# Patient Record
Sex: FEMALE | Race: WHITE | URBAN - METROPOLITAN AREA
[De-identification: names, ages, dates, MRNs, and addresses within clinical notes are randomized per-mention and may not be internally consistent; named-entity substitution may affect disease eponyms.]

---

## 2017-03-05 ENCOUNTER — HOSPITAL ENCOUNTER (EMERGENCY)
Facility: CLINIC | Age: 42
Discharge: HOME | End: 2017-03-05
Attending: EMERGENCY MEDICINE | Admitting: EMERGENCY MEDICINE
Payer: MEDICAID

## 2017-03-05 VITALS
SYSTOLIC BLOOD PRESSURE: 120 MMHG | HEART RATE: 102 BPM | WEIGHT: 190 LBS | DIASTOLIC BLOOD PRESSURE: 76 MMHG | OXYGEN SATURATION: 100 % | RESPIRATION RATE: 20 BRPM | TEMPERATURE: 98.1 F

## 2017-03-05 DIAGNOSIS — L50.9 HIVES: ICD-10-CM

## 2017-03-05 PROCEDURE — 96374 THER/PROPH/DIAG INJ IV PUSH: CPT

## 2017-03-05 PROCEDURE — 25000128 H RX IP 250 OP 636: Performed by: EMERGENCY MEDICINE

## 2017-03-05 PROCEDURE — 99284 EMERGENCY DEPT VISIT MOD MDM: CPT | Mod: 25

## 2017-03-05 PROCEDURE — 96372 THER/PROPH/DIAG INJ SC/IM: CPT

## 2017-03-05 RX ORDER — METHYLPREDNISOLONE SODIUM SUCCINATE 125 MG/2ML
125 INJECTION, POWDER, LYOPHILIZED, FOR SOLUTION INTRAMUSCULAR; INTRAVENOUS ONCE
Status: COMPLETED | OUTPATIENT
Start: 2017-03-05 | End: 2017-03-05

## 2017-03-05 RX ORDER — PREDNISONE 20 MG/1
40 TABLET ORAL DAILY
Qty: 8 TABLET | Refills: 0 | Status: SHIPPED | OUTPATIENT
Start: 2017-03-05 | End: 2017-03-09

## 2017-03-05 RX ADMIN — METHYLPREDNISOLONE SODIUM SUCCINATE 125 MG: 125 INJECTION, POWDER, FOR SOLUTION INTRAMUSCULAR; INTRAVENOUS at 22:40

## 2017-03-05 RX ADMIN — EPINEPHRINE 0.3 MG: 1 INJECTION INTRAMUSCULAR; INTRAVENOUS; SUBCUTANEOUS at 22:33

## 2017-03-05 RX ADMIN — SODIUM CHLORIDE 1000 ML: 9 INJECTION, SOLUTION INTRAVENOUS at 22:40

## 2017-03-05 NOTE — ED AVS SNAPSHOT
[facility] Emergency Department    [address] [place] [address]    Phone:  [phone]    Fax:  [phone]                                       Maddi Muller   MRN: [id number]    Department:  [facility] Emergency Department   Date of Visit:  3/5/2017           Patient Information     Date Of Birth          1975        Your diagnoses for this visit were:     Hives        You were seen by Suly Garduno MD.      Follow-up Information     Follow up with [facility] Emergency Department.    Specialty:  EMERGENCY MEDICINE    Why:  As needed, If symptoms worsen    Contact information:    [address] [place] [address] [phone]      Discharge References/Attachments     URTICARIA (HIVES), UNDERSTANDING (ENGLISH)      24 Hour Appointment Hotline       To make an appointment at any [facility] clinic, call [phone] ([phone]). If you don't have a family doctor or clinic, we will help you find one. [facility] clinics are conveniently located to serve the needs of you and your family.             Review of your medicines      START taking        Dose / Directions Last dose taken    predniSONE 20 MG tablet   Commonly known as:  DELTASONE   Dose:  40 mg   Quantity:  8 tablet        Take 2 tablets (40 mg) by mouth daily for 4 days   Refills:  0                Prescriptions were sent or printed at these locations (1 Prescription)                   Other Prescriptions                Printed at Department/Unit printer (1 of 1)         predniSONE (DELTASONE) 20 MG tablet                Orders Needing Specimen Collection     None      Pending Results     No orders found from 3/3/2017 to 3/6/2017.            Pending Culture Results     No orders found from 3/3/2017 to 3/6/2017.             Test Results from your hospital stay            Clinical Quality Measure: Blood Pressure Screening     Your blood  "pressure was checked while you were in the emergency department today. The last reading we obtained was  BP: 120/76 . Please read the guidelines below about what these numbers mean and what you should do about them.  If your systolic blood pressure (the top number) is less than 120 and your diastolic blood pressure (the bottom number) is less than 80, then your blood pressure is normal. There is nothing more that you need to do about it.  If your systolic blood pressure (the top number) is 120-139 or your diastolic blood pressure (the bottom number) is 80-89, your blood pressure may be higher than it should be. You should have your blood pressure rechecked within a year by a primary care provider.  If your systolic blood pressure (the top number) is 140 or greater or your diastolic blood pressure (the bottom number) is 90 or greater, you may have high blood pressure. High blood pressure is treatable, but if left untreated over time it can put you at risk for heart attack, stroke, or kidney failure. You should have your blood pressure rechecked by a primary care provider within the next 4 weeks.  If your provider in the emergency department today gave you specific instructions to follow-up with your doctor or provider even sooner than that, you should follow that instruction and not wait for up to 4 weeks for your follow-up visit.        Thank you for choosing [facility]       Thank you for choosing [facility] for your care. Our goal is always to provide you with excellent care. Hearing back from our patients is one way we can continue to improve our services. Please take a few minutes to complete the written survey that you may receive in the mail after you visit with us. Thank you!        netTALKhart Information     [facility] lets you send messages to your doctor, view your test results, renew your prescriptions, schedule appointments and more. To sign up, go to [email or web address] . Click on \"Log in\" on the left side " "of the screen, which will take you to the Welcome page. Then click on \"Sign up Now\" on the right side of the page.     You will be asked to enter the access code listed below, as well as some personal information. Please follow the directions to create your username and password.     Your access code is: [email or web address]  Expires: 6/3/2017 11:08 PM     Your access code will  in 90 days. If you need help or a new code, please call your [facility] clinic or [phone].        Care EveryWhere ID     This is your Care EveryWhere ID. This could be used by other organizations to access your [facility] medical records  [id number]        After Visit Summary       This is your record. Keep this with you and show to your community pharmacist(s) and doctor(s) at your next visit.                  "

## 2017-03-06 NOTE — ED NOTES
Hives started 2 weeks ago with itching, Eyes swelling started last night night owrse today. NO dificulty swallowing or breathing. ABC Intact alert and no distress. Chest pain earlier today now resolved.   Patient states has had allergic reaction in the past and prednisone had helped.

## 2017-03-06 NOTE — ED PROVIDER NOTES
History     Chief Complaint:  Allergic Reaction    HPI   Maddi Muller is a 41 year old female who presents with worsening hives and new right eyelid swelling.  Occasional hives have been present for the past three weeks but symptoms acutely worsened today.  She has been taking alegra, benadryl, and ranitidine without improvement.  No new exposures.  Previously saw an allergist after hives for 6 weeks and etiology was not determined.  Had improved with steroids then.  Today had GI symptoms with burping and a sensation of internal tightening or squeezing.  No respiratory symptoms.    Allergies:  No Known Drug Allergies      Medications:    The patient is currently on no regular medications.     Past Medical History:    The patient denies any relevant past medical history.     Past Surgical History:    History reviewed. No pertinent past surgical history.     Family History:    The patient denies any relevant family medical history.     Social History:  Smoking Status: No  Smokeless Tobacco: No  Alcohol Use: No     Review of Systems  Ten system ROS reviewed and is negative except as above      Physical Exam   Vitals:  Patient Vitals for the past 24 hrs:   BP Temp Temp src Pulse Resp SpO2 Weight   03/05/17 2315 - - - - - 100 % -   03/05/17 2300 120/76 - - 102 20 100 % -   03/05/17 2245 108/66 - - 105 - 98 % -   03/05/17 2230 - - - - - 100 % -   03/05/17 2215 - - - - - 100 % -   03/05/17 2208 114/79 - - 108 20 100 % -   03/05/17 2206 - 98.1  F (36.7  C) Oral - - - 86.2 kg (190 lb)      Physical Exam  Eyes:  Sclera white; Pupils are equal and round  ENT:    External ears and nares normal  CV:  Rate as above with regular rhythm   Resp:  Breath sounds clear and equal bilaterally    Non-labored, no retractions or accessory muscle use  GI:  Abdomen is soft, non-tender, non-distended    No rebound tenderness or peritoneal features  MS:  Moves all extremities  Skin:  Diffuse hives, right eyelid  swelling  Neuro: Speech is normal and fluent. No apparent deficit.    Emergency Department Course     Interventions:  2233 Epinephrine 0.3 mg IM  2240 Solu-medrol 125 mg IV  2240 Normal Saline 1000 mL IV      Emergency Department Course:  Nursing notes and vitals reviewed.  I performed an exam of the patient as documented above.   The patient was rechecked and her symptoms had resolved after medications here.    I discussed the treatment plan with the patient. They expressed understanding of this plan and consented to discharge. They will be discharged home with instructions for care and follow up. In addition, the patient will return to the emergency department if their symptoms persist, worsen, if new symptoms arise or if there is any concern.  All questions were answered.    I personally reviewed the laboratory results with the Patient and answered all related questions prior to discharge.    Impression & Plan      Medical Decision Making:  Maddi Muller is a 41 year old female who presents to the emergency department today with recurrent idiopathic hives.She has associated GI involvement concerning for anaphylaxis. Epinephrine was given and her hives started improving. She had no further abdominal symptoms in the department. Follow up with allergist was recommended and she will be discharged on a steroid burst.     Diagnosis:    ICD-10-CM    1. Hives L50.9       Disposition:   Discharge to home     Discharge Medications:  Discharge Medication List as of 3/5/2017 11:22 PM      START taking these medications    Details   predniSONE (DELTASONE) 20 MG tablet Take 2 tablets (40 mg) by mouth daily for 4 days, Disp-8 tablet, R-0, Local Print           Scribe Disclosure:  I, Tyler Siu, am serving as a scribe at 10:20 PM on 3/5/2017 to document services personally performed by Suly Garduno MD, based on my observations and the provider's statements to me.   3/5/2017   [facility]  \[facility] [place] [facility]\"" EMERGENCY DEPARTMENT       [facility] Hospital[facility] MD  03/07/17 8308

## 2017-03-06 NOTE — ED NOTES
Noted improvement in swelling and hives. Patient reports significant improvement in itching and discomfort. Continues to deny swelling in throat, difficulty swallowing, or shortness of breath. Patient meets discharge criteria. Discussed AVS with patient. Questions answered. Patient verbalized understanding. Patient reports being ready to go home. Patient discharged home by car with all necessary information.

## 2017-03-11 ENCOUNTER — HOSPITAL ENCOUNTER (EMERGENCY)
Facility: CLINIC | Age: 42
Discharge: HOME | End: 2017-03-11
Attending: EMERGENCY MEDICINE | Admitting: EMERGENCY MEDICINE
Payer: MEDICAID

## 2017-03-11 VITALS
SYSTOLIC BLOOD PRESSURE: 132 MMHG | OXYGEN SATURATION: 100 % | HEART RATE: 109 BPM | DIASTOLIC BLOOD PRESSURE: 77 MMHG | TEMPERATURE: 98.5 F | RESPIRATION RATE: 18 BRPM

## 2017-03-11 DIAGNOSIS — L50.9 HIVES: ICD-10-CM

## 2017-03-11 PROCEDURE — 25000132 ZZH RX MED GY IP 250 OP 250 PS 637: Performed by: EMERGENCY MEDICINE

## 2017-03-11 PROCEDURE — 99283 EMERGENCY DEPT VISIT LOW MDM: CPT

## 2017-03-11 RX ORDER — DIPHENHYDRAMINE HCL 25 MG
CAPSULE ORAL
Status: DISCONTINUED
Start: 2017-03-11 | End: 2017-03-11 | Stop reason: HOSPADM

## 2017-03-11 RX ORDER — PREDNISONE 10 MG/1
TABLET ORAL
Qty: 32 TABLET | Refills: 0 | Status: SHIPPED | OUTPATIENT
Start: 2017-03-11

## 2017-03-11 RX ORDER — DIPHENHYDRAMINE HCL 25 MG
50 CAPSULE ORAL ONCE
Status: COMPLETED | OUTPATIENT
Start: 2017-03-11 | End: 2017-03-11

## 2017-03-11 RX ADMIN — DIPHENHYDRAMINE HYDROCHLORIDE 50 MG: 25 CAPSULE ORAL at 12:57

## 2017-03-11 NOTE — ED NOTES
Patient presents with complaint of hives across arms, legs and torso. Patient states she was treated x1 week ago for anaphylaxis to an unknown substance. States that the hives returned after she ended her course of prednisone two days ago. Denies tongue, neck or throat swelling. ABC intact, A&Ox3.

## 2017-03-11 NOTE — ED AVS SNAPSHOT
[facility] Emergency Department    [address]    [facility] [address]    Phone:  [phone]    Fax:  [phone]                                       Maddi Muller   MRN: [id number]    Department:  [facility] Emergency Department   Date of Visit:  3/11/2017           After Visit Summary Signature Page     I have received my discharge instructions, and my questions have been answered. I have discussed any challenges I see with this plan with the nurse or doctor.    ..........................................................................................................................................  Patient/Patient Representative Signature      ..........................................................................................................................................  Patient Representative Print Name and Relationship to Patient    ..................................................               ................................................  Date                                            Time    ..........................................................................................................................................  Reviewed by Signature/Title    ...................................................              ..............................................  Date                                                            Time

## 2017-03-11 NOTE — DISCHARGE INSTRUCTIONS
Please call your PCP to get a referral (again) for the hives.    Come back if any difficulty breathing or lip swelling or cannot breathe.

## 2017-03-11 NOTE — ED PROVIDER NOTES
History     Chief Complaint:  Rash     HPI   Maddi Muller is a 41 year old female who presents for evaluation of a rash. The patient has a history of recurrent outbreaks of hives. She has been evaluated regarding these episodes by dermatology and an allergist without any conclusive diagnosis. She has attempted allegra, benadryl, singular, and ranitidine without improvement of her hives. She was seen in the ED on 3/5/2017, with a similar outbreak of hives and she had improvement after receiving IM Epinephrine and IV Solu-Medrol. She was discharged with a course of Prednisone, which she reports is the only medication that has helped manage her hives. Over the past day or two, the patient developed a recurrent episode of hives similar to what she has previously experienced, prompting her to seek reevaluation in the ED. She notes that she typically feels increasingly stressed prior to episodes where she develops these hives, and she also believes that these hives could be related to a potential gluten allergy. She has not recently seen her primary care physician regarding these episodes of hives.     Allergies:  NKDA     Medications:    Montelukast Sodium (SINGULAIR PO)  RANITIDINE HCL PO  HYDRALAZINE HCL PO  Fexofenadine HCl (ALLEGRA PO)    Past Medical History:    Hives  Nasal allergies    Past Surgical History:    History reviewed. No pertinent past surgical history.     Family History:    History reviewed. No pertinent family history.     Social History:  Tobacco use:    Current every day smoker - 0.50 packs / day   Alcohol use:    Negative  Marital status:       Accompanied to ED by:  Alone      Review of Systems   Skin: Positive for rash (hives ).   All other systems reviewed and are negative.  Patient presents with complaint of hives across arms, legs and torso. Patient states she was treated x1 week ago for allergic reaction to an unknown substance. States that the hives returned  after she ended her course of prednisone two days ago. Denies tongue, neck or throat swelling.  Patient has been worked up for this in the past.    Physical Exam   First Vitals:  BP: 132/77  Pulse: 109  Temp: 98.5  F (36.9  C)  Resp: 16  SpO2: 100 %      Physical Exam  GEN: patient scratchy but alert  HEAD: atraumatic, normocephalic  EYES: pupils reactive , conjunctivae normal  ENT: TMs flat and white bilaterally, oropharynx normal with no erythema or exudate, mucus membranes moist, no mucus membrane lesions, no angioedema  NECK: no cervical LAD  RESPIRATORY: no tachypnea, breath sounds clear to auscultation (no rales, wheezes, rhonchi)  CVS: normal S1/S2, no murmurs/rubs/gallops  ABDOMEN: soft, nontender, no masses or organomegaly, no rebound, positive bowel sounds  BACK: no CVAT  EXTREMITIES: intact pulses x 2 (radial pulses intact), no edema  MUSCULOSKELETAL: no deformities, no jont swelling  SKIN: warm and dry, hives over torso and legs and arms, palms and soles not affected, no target lesions  NEURO: GCS 15, cranial nerves intact.  Motor- moves all 4 extremities Sensation- intact.  Coordination- ambulatory.  Overall symmetrical exam  HEME: no bruising or petechiae/contusions  LYMPH: no lymphadenopathy      Emergency Department Course     Interventions:  1257 Benadryl 50 mg PO    Emergency Department Course:  Nursing notes and vitals reviewed.  1300: I performed an exam of the patient as documented above.     Findings and plan explained to the Patient. Patient discharged home with instructions regarding supportive care, medications, and reasons to return. The importance of close follow-up was reviewed. The patient was prescribed Prednisone.      Impression & Plan      Medical Decision Making:  Maddi Muller is a 41 year old female who has a history of hives and skin reaction for unclear reasons (has seen the dermatologist with no etiology). The patient was just here a couple weeks ago and got a  steroid taper in addition to IV Solu-Medrol and she reports that it has decreased. She has been to a dermatologist in the past who has not been able to determine the etiology of this. She has no signs of angioedema, no uvula swelling, and we will repeat the dosepak, but I do want the patient to follow up with her primary care. She was given a 14 day prednisone pack steroid taper.  Plans to refer to back to PCP and further testing.  No new drugs or soaps or clothing to suggest the etiology.    Diagnosis:    ICD-10-CM   1. Hives L50.9   2.  Urticaria.    Disposition:  Discharged to home with Prednisone.     Discharge Medications:  New Prescriptions    PREDNISONE (DELTASONE) 10 MG TABLET    15 day taper   40mg days #1, 2, 3  35mg days #4, 5, 6  30mg days #7, 8, 9  25mg days #10,11, 12  20mg days #13, 14, 15     Instructions to patient:  Please call your PCP to get a referral (again) for the hives.    Come back if any difficulty breathing or lip swelling or cannot breathe.    I, [place], am serving as a scribe at 1:00 PM on 3/11/2017 to document services personally performed by Dr. Hernandez, based on my observations and the provider's statements to me.    [facility] EMERGENCY DEPARTMENT       Kasey Hernandez MD  03/11/17 9051

## 2017-03-11 NOTE — ED AVS SNAPSHOT
[facility] Emergency Department    [address] [place] [address]    Phone:  [phone]    Fax:  [phone]                                       Maddi Muller   MRN: [id number]    Department:  [facility] Emergency Department   Date of Visit:  3/11/2017           Patient Information     Date Of Birth          1975        Your diagnoses for this visit were:     Hives        You were seen by Kasey Hernandez MD.      Follow-up Information     Follow up with Pedro Collins MD.    Specialty:  Allergy    Contact information:    [address] [facility] [facility]  [address]  [facility] [address]  [phone]          Discharge Instructions       Please call your PCP to get a referral (again) for the hives.    Come back if any difficulty breathing or lip swelling or cannot breathe.    Discharge References/Attachments     URTICARIA (HIVES), UNDERSTANDING (ENGLISH)    HIVES (ADULT) (ENGLISH)      24 Hour Appointment Hotline       To make an appointment at any [facility], call [phone] ([phone]). If you don't have a family doctor or clinic, we will help you find one. [facility] clinics are conveniently located to serve the needs of you and your family.             Review of your medicines      START taking        Dose / Directions Last dose taken    predniSONE 10 MG tablet   Commonly known as:  DELTASONE   Quantity:  32 tablet        15 day taper  40mg days #1, 2, 3 35mg days #4, 5, 6 30mg days #7, 8, 9 25mg days #10,11, 12 20mg days #13, 14, 15   Refills:  0          Our records show that you are taking the medicines listed below. If these are incorrect, please call your family doctor or clinic.        Dose / Directions Last dose taken    ALLEGRA PO        Refills:  0        HYDRALAZINE HCL PO        Refills:  0        RANITIDINE HCL PO        Refills:  0        SINGULAIR PO        Refills:  0                Prescriptions were  sent or printed at these locations (1 Prescription)                   Other Prescriptions                Printed at Department/Unit printer (1 of 1)         predniSONE (DELTASONE) 10 MG tablet                Orders Needing Specimen Collection     None      Pending Results     No orders found from 3/9/2017 to 3/12/2017.            Pending Culture Results     No orders found from 3/9/2017 to 3/12/2017.             Test Results from your hospital stay            Clinical Quality Measure: Blood Pressure Screening     Your blood pressure was checked while you were in the emergency department today. The last reading we obtained was  BP: 132/77 . Please read the guidelines below about what these numbers mean and what you should do about them.  If your systolic blood pressure (the top number) is less than 120 and your diastolic blood pressure (the bottom number) is less than 80, then your blood pressure is normal. There is nothing more that you need to do about it.  If your systolic blood pressure (the top number) is 120-139 or your diastolic blood pressure (the bottom number) is 80-89, your blood pressure may be higher than it should be. You should have your blood pressure rechecked within a year by a primary care provider.  If your systolic blood pressure (the top number) is 140 or greater or your diastolic blood pressure (the bottom number) is 90 or greater, you may have high blood pressure. High blood pressure is treatable, but if left untreated over time it can put you at risk for heart attack, stroke, or kidney failure. You should have your blood pressure rechecked by a primary care provider within the next 4 weeks.  If your provider in the emergency department today gave you specific instructions to follow-up with your doctor or provider even sooner than that, you should follow that instruction and not wait for up to 4 weeks for your follow-up visit.        Thank you for choosing [facility]       Thank you for choosing  "[facility] for your care. Our goal is always to provide you with excellent care. Hearing back from our patients is one way we can continue to improve our services. Please take a few minutes to complete the written survey that you may receive in the mail after you visit with us. Thank you!        Streetlife[employer]TalkSession Information     [facility] lets you send messages to your doctor, view your test results, renew your prescriptions, schedule appointments and more. To sign up, go to [email or web address] . Click on \"Log in\" on the left side of the screen, which will take you to the Welcome page. Then click on \"Sign up Now\" on the right side of the page.     You will be asked to enter the access code listed below, as well as some personal information. Please follow the directions to create your username and password.     Your access code is: [email or web address]  Expires: 6/3/2017 11:08 PM     Your access code will  in 90 days. If you need help or a new code, please call your [facility] clinic or [phone].        Care EveryWhere ID     This is your Care EveryWhere ID. This could be used by other organizations to access your [facility] medical records  [id number]        After Visit Summary       This is your record. Keep this with you and show to your community pharmacist(s) and doctor(s) at your next visit.                  "

## 2017-05-08 LAB
HBV SURFACE AG SERPL QL IA: NON REACTIVE
HIV 1+2 AB+HIV1 P24 AG SERPL QL IA: NON REACTIVE
RUBELLA ANTIBODY IGG QUANTITATIVE: NORMAL IU/ML

## 2017-10-02 ENCOUNTER — HOSPITAL ENCOUNTER (OUTPATIENT)
Facility: CLINIC | Age: 42
Discharge: HOME | End: 2017-10-02
Attending: OBSTETRICS & GYNECOLOGY | Admitting: OBSTETRICS & GYNECOLOGY
Payer: COMMERCIAL

## 2017-10-02 VITALS
RESPIRATION RATE: 20 BRPM | DIASTOLIC BLOOD PRESSURE: 83 MMHG | SYSTOLIC BLOOD PRESSURE: 137 MMHG | BODY MASS INDEX: 35.61 KG/M2 | TEMPERATURE: 98.9 F | WEIGHT: 235 LBS | HEART RATE: 103 BPM | HEIGHT: 68 IN

## 2017-10-02 PROCEDURE — 99213 OFFICE O/P EST LOW 20 MIN: CPT | Mod: 25

## 2017-10-02 PROCEDURE — 99214 OFFICE O/P EST MOD 30 MIN: CPT | Mod: 25

## 2017-10-02 PROCEDURE — 99214 OFFICE O/P EST MOD 30 MIN: CPT

## 2017-10-02 PROCEDURE — 59025 FETAL NON-STRESS TEST: CPT

## 2017-10-02 RX ORDER — PRENATAL VIT/IRON FUM/FOLIC AC 27MG-0.8MG
1 TABLET ORAL DAILY
COMMUNITY

## 2017-10-02 NOTE — IP AVS SNAPSHOT
[facility] Labor and Delivery    [address]    [facility] [address]    Phone:  [phone]    Fax:  [phone]                                       After Visit Summary   10/2/2017    Maddi Muller    MRN: [id number]           After Visit Summary Signature Page     I have received my discharge instructions, and my questions have been answered. I have discussed any challenges I see with this plan with the nurse or doctor.    ..........................................................................................................................................  Patient/Patient Representative Signature      ..........................................................................................................................................  Patient Representative Print Name and Relationship to Patient    ..................................................               ................................................  Date                                            Time    ..........................................................................................................................................  Reviewed by Signature/Title    ...................................................              ..............................................  Date                                                            Time

## 2017-10-02 NOTE — DISCHARGE INSTRUCTIONS
Discharge Instruction for Undelivered Patients      You were seen for: Labor Assessment  We Consulted: Dr. Sunshine  You had (Test or Medicine): fetal and uterine monitoring, cervical exam    Diet:   Drink 8 to 12 glasses of liquids (milk, juice, water) every day.  You may eat meals and snacks.     Activity:  Count fetal kicks everyday (see handout)  Call your doctor or nurse midwife if your baby is moving less than usual.     Call your provider if you notice:  Swelling in your face or increased swelling in your hands or legs.  Headaches that are not relieved by Tylenol (acetaminophen).  Changes in your vision (blurring: seeing spots or stars.)  Nausea (sick to your stomach) and vomiting (throwing up).   Weight gain of 5 pounds or more per week.  Heartburn that doesn't go away.  Signs of bladder infection: pain when you urinate (use the toilet), need to go more often and more urgently.  The bag of dougherty (rupture of membranes) breaks, or you notice leaking in your underwear.  Bright red blood in your underwear.  Abdominal (lower belly) or stomach pain.  Second (plus) baby: Contractions (tightening) less than 10 minutes apart and getting stronger.  Increase or change in vaginal discharge (note the color and amount)  Other: Please call your doctor with returning or worsening symptoms.    Follow-up:  Please call your clinic in the morning to schedule an appointment between now and Wednesday for routine prenatal care.

## 2017-10-02 NOTE — PLAN OF CARE
Data: Patient presented to [facility] at 0125.  Reason for maternal/fetal assessment per patient is to rule out labor. Patient reports off and on cramping since last evening.  Patient reports low uterine cramping that is irregular in nature, with some cramps being stronger than others.  Patient reports cramping pain to be 1-6/10 for pain.  Patient reports difficulty with bowel movements, and thinks she might be constipated.  Patient is a .  Prenatal record reviewed. Pregnancy has been uncomplicated thus far although patient has not been seen in the clinic since 19.  Gestational Age 36.1. VSS. Fetal movement present. Patient denies backache, abnormal vaginal discharge, pelvic pressure, UTI symptoms, GI problems, bloody show, vaginal bleeding, edema, headache, visual disturbances, epigastric or URQ pain, rupture of membranes. Support person, Jacky, is present.   Action: Verbal consent for EFM. Triage assessment completed. Bill of rights reviewed.    Response: Patient verbalized agreement with plan. Will contact Dr. Sunshine.

## 2017-10-02 NOTE — PROVIDER NOTIFICATION
10/02/17 0202   Provider Notification   Provider Name/Title Dr. Sunshine   Method of Notification Phone   Request Evaluate - Remote   Notification Reason Patient Arrived   Dr. Sunshine updated on pt arrival, gestational age, G&P, pt complaint (see nursing note), FHR Cat. I, uterine irritability noted, abdomen soft and non tender to palpate.  Discussed pt has not been seen in clinic since 29 weeks.  Dr. Sunshine verbalized understanding.  TORB for NST, d/c pt home with education on importance of following up weekly in clinic at this point.  MD requested this RN to educate pt to call for appt early this week.   Will update pt on POC and continue to monitor.

## 2017-10-02 NOTE — PLAN OF CARE
Data: [facility].  Cervical exam closed/thick/high.  Membranes intact.  Contractions irregular.  Action:  Presumed adequate fetal oxygenation documented (see flow record). Discharge instructions reviewed.  Patient instructed to report change in fetal movement, vaginal leaking of fluid or bleeding, abdominal pain, or any concerns related to the pregnancy to her nurse/physician.   Response: Orders to discharge home per Dr. Sunshine.  Patient verbalized understanding of education and verbalized agreement with plan.

## 2017-10-02 NOTE — IP AVS SNAPSHOT
MRN:[id number]                      After Visit Summary   10/2/2017    Maddi Muller    MRN: [id number]           Thank you!     Thank you for choosing [facility] for your care. Our goal is always to provide you with excellent care. Hearing back from our patients is one way we can continue to improve our services. Please take a few minutes to complete the written survey that you may receive in the mail after you visit. If you would like to speak to someone directly about your visit please contact Patient Relations at [phone]. Thank you!          Patient Information     Date Of Birth          1975        About your hospital stay     You were admitted on:  October 2, 2017 You last received care in the:  [facility] Labor and Delivery    You were discharged on:  October 2, 2017       Who to Call     For medical emergencies, please call 911.  For non-urgent questions about your medical care, please call your primary care provider or clinic, [phone]          Attending Provider     Provider Specialty    Jayla Sunshine MD OB/Gyn       Primary Care Provider Office Phone # Fax #    Pedro Collins [phone]      Further instructions from your care team       Discharge Instruction for Undelivered Patients      You were seen for: Labor Assessment  We Consulted: Dr. Sunshine  You had (Test or Medicine): fetal and uterine monitoring, cervical exam    Diet:   Drink 8 to 12 glasses of liquids (milk, juice, water) every day.  You may eat meals and snacks.     Activity:  Count fetal kicks everyday (see handout)  Call your doctor or nurse midwife if your baby is moving less than usual.     Call your provider if you notice:  Swelling in your face or increased swelling in your hands or legs.  Headaches that are not relieved by Tylenol (acetaminophen).  Changes in your vision (blurring: seeing spots or stars.)  Nausea (sick to your stomach)  "and vomiting (throwing up).   Weight gain of 5 pounds or more per week.  Heartburn that doesn't go away.  Signs of bladder infection: pain when you urinate (use the toilet), need to go more often and more urgently.  The bag of dougherty (rupture of membranes) breaks, or you notice leaking in your underwear.  Bright red blood in your underwear.  Abdominal (lower belly) or stomach pain.  Second (plus) baby: Contractions (tightening) less than 10 minutes apart and getting stronger.  Increase or change in vaginal discharge (note the color and amount)  Other: Please call your doctor with returning or worsening symptoms.    Follow-up:  Please call your clinic in the morning to schedule an appointment between now and Wednesday for routine prenatal care.        Pending Results     No orders found from 2017 to 10/3/2017.            Admission Information     Date & Time Provider Department Dept. Phone    10/2/2017 Jayla Sunshine MD [facility] Labor and Delivery [phone]      Your Vitals Were     Blood Pressure Pulse Temperature Height Weight BMI (Body Mass Index)    137/83 103 98.9  F (37.2  C) (Oral) 1.727 m (5' 8\") 106.6 kg (235 lb) 35.73 kg/m2      [employer] Information     [email or web address] lets you send messages to your doctor, view your test results, renew your prescriptions, schedule appointments and more. To sign up, go to [email or web address] . Click on \"Log in\" on the left side of the screen, which will take you to the Welcome page. Then click on \"Sign up Now\" on the right side of the page.     You will be asked to enter the access code listed below, as well as some personal information. Please follow the directions to create your username and password.     Your access code is: [email or web address]  Expires: 2017  2:16 AM     Your access code will  in 90 days. If you need help or a new code, please call your [place] clinic or [phone].        Care EveryWhere ID     This is your Care " EveryWhere ID. This could be used by other organizations to access your [place] medical records  [id number]        Equal Access to Services     DAINA CONCEPCION : Mackenzie Denney, watenzinda luqadaha, q[place] kaalmada carlita, ruddy melyin hayaaanais foxadelso cervantes romy he. So [facility] [phone].    ATENCIÓN: Si habla español, tiene a hawley disposición servicios gratuitos de asistencia lingüística. Llame al [phone].    We comply with applicable federal civil rights laws and [place] laws. We do not discriminate on the basis of race, color, national origin, age, disability, sex, sexual orientation, or gender identity.               Review of your medicines      UNREVIEWED medicines. Ask your doctor about these medicines        Dose / Directions    ALLEGRA PO        Refills:  0       BENADRYL PO        Dose:  50 mg   Take 50 mg by mouth every 6 hours as needed for allergies   Refills:  0       predniSONE 10 MG tablet   Commonly known as:  DELTASONE        15 day taper  40mg days #1, 2, 3 35mg days #4, 5, 6 30mg days #7, 8, 9 25mg days #10,11, 12 20mg days #13, 14, 15   Quantity:  32 tablet   Refills:  0       prenatal multivitamin plus iron 27-0.8 MG Tabs per tablet        Dose:  1 tablet   Take 1 tablet by mouth daily   Refills:  0       RANITIDINE HCL PO        Dose:  75 mg   Take 75 mg by mouth daily   Refills:  0       VISTARIL PO        Dose:  100 mg   Take 100 mg by mouth every 4 hours as needed for itching   Refills:  0                Protect others around you: Learn how to safely use, store and throw away your medicines at www.disposemymeds.org.             Medication List: This is a list of all your medications and when to take them. Check marks below indicate your daily home schedule. Keep this list as a reference.      Medications           Morning Afternoon Evening Bedtime As Needed    ALLEGRA PO                                BENADRYL PO   Take 50 mg by mouth every 6 hours as needed for allergies                                 predniSONE 10 MG tablet   Commonly known as:  DELTASONE   15 day taper  40mg days #1, 2, 3 35mg days #4, 5, 6 30mg days #7, 8, 9 25mg days #10,11, 12 20mg days #13, 14, 15                                prenatal multivitamin plus iron 27-0.8 MG Tabs per tablet   Take 1 tablet by mouth daily                                RANITIDINE HCL PO   Take 75 mg by mouth daily                                VISTARIL PO   Take 100 mg by mouth every 4 hours as needed for itching

## 2017-10-04 LAB — GROUP B STREP PCR: NEGATIVE

## 2017-10-29 ENCOUNTER — HOSPITAL ENCOUNTER (OUTPATIENT)
Facility: CLINIC | Age: 42
Discharge: HOME | End: 2017-10-29
Attending: OBSTETRICS & GYNECOLOGY | Admitting: OBSTETRICS & GYNECOLOGY
Payer: COMMERCIAL

## 2017-10-29 PROCEDURE — 25000132 ZZH RX MED GY IP 250 OP 250 PS 637: Performed by: OBSTETRICS & GYNECOLOGY

## 2017-10-29 PROCEDURE — 99213 OFFICE O/P EST LOW 20 MIN: CPT

## 2017-10-29 RX ORDER — ZOLPIDEM TARTRATE 5 MG/1
5 TABLET ORAL ONCE
Status: COMPLETED | OUTPATIENT
Start: 2017-10-29 | End: 2017-10-29

## 2017-10-29 RX ORDER — ONDANSETRON 2 MG/ML
4 INJECTION INTRAMUSCULAR; INTRAVENOUS EVERY 6 HOURS PRN
Status: DISCONTINUED | OUTPATIENT
Start: 2017-10-29 | End: 2017-10-30 | Stop reason: HOSPADM

## 2017-10-29 RX ADMIN — ZOLPIDEM TARTRATE 5 MG: 5 TABLET, FILM COATED ORAL at 23:15

## 2017-10-29 NOTE — IP AVS SNAPSHOT
[facility] Labor and Delivery    [address]    [facility] [address]    Phone:  [phone]    Fax:  [phone]                                       After Visit Summary   10/29/2017    Maddi Muller    MRN: [id number]           After Visit Summary Signature Page     I have received my discharge instructions, and my questions have been answered. I have discussed any challenges I see with this plan with the nurse or doctor.    ..........................................................................................................................................  Patient/Patient Representative Signature      ..........................................................................................................................................  Patient Representative Print Name and Relationship to Patient    ..................................................               ................................................  Date                                            Time    ..........................................................................................................................................  Reviewed by Signature/Title    ...................................................              ..............................................  Date                                                            Time

## 2017-10-29 NOTE — IP AVS SNAPSHOT
MRN:[id number]                      After Visit Summary   10/29/2017    Maddi Muller    MRN: [id number]           Thank you!     Thank you for choosing [facility] for your care. Our goal is always to provide you with excellent care. Hearing back from our patients is one way we can continue to improve our services. Please take a few minutes to complete the written survey that you may receive in the mail after you visit. If you would like to speak to someone directly about your visit please contact Patient Relations at [phone]. Thank you!          Patient Information     Date Of Birth          1975        About your hospital stay     You were admitted on:  October 29, 2017 You last received care in the:  [facility] Labor and Delivery    You were discharged on:  October 29, 2017       Who to Call     For medical emergencies, please call 911.  For non-urgent questions about your medical care, please call your primary care provider or clinic, [phone]          Attending Provider     Provider Specialty    Dl Regalado MD OB/Gyn       Primary Care Provider Office Phone # Fax #    Pedro Collins [phone]      Further instructions from your care team       Discharge Instruction for Undelivered Patients      You were seen for: Labor Assessment  We Consulted: Dr. Regalado  You had (Test or Medicine):fetal monitoring, cervical exam     Diet:   Drink 8 to 12 glasses of liquids (milk, juice, water) every day.     Activity:  Call your doctor or nurse midwife if your baby is moving less than usual.     Call your provider if you notice:  Swelling in your face or increased swelling in your hands or legs.  Headaches that are not relieved by Tylenol (acetaminophen).  Changes in your vision (blurring: seeing spots or stars.)  Nausea (sick to your stomach) and vomiting (throwing up).   Weight gain of 5 pounds or more per week.  Heartburn that doesn't  "go away.  Signs of bladder infection: pain when you urinate (use the toilet), need to go more often and more urgently.  The bag of dougherty (rupture of membranes) breaks, or you notice leaking in your underwear.  Bright red blood in your underwear.  Abdominal (lower belly) or stomach pain.  Contractions (tightening) less than 10 minutes apart and getting stronger.  Increase or change in vaginal discharge (note the color and amount)    Follow-up:  As scheduled in the clinic          Pending Results     No orders found from 10/27/2017 to 10/30/2017.            Admission Information     Date & Time Provider Department Dept. Phone    10/29/2017 Dl Regalado MD [facility] Labor and Delivery [phone]      MyChart Information     [employer] lets you send messages to your doctor, view your test results, renew your prescriptions, schedule appointments and more. To sign up, go to [email or web address] . Click on \"Log in\" on the left side of the screen, which will take you to the Welcome page. Then click on \"Sign up Now\" on the right side of the page.     You will be asked to enter the access code listed below, as well as some personal information. Please follow the directions to create your username and password.     Your access code is: [email or web address]  Expires: 2017  2:16 AM     Your access code will  in 90 days. If you need help or a new code, please call your [place] clinic or [phone].        Care EveryWhere ID     This is your Care EveryWhere ID. This could be used by other organizations to access your [place] medical records  [id number]        Equal Access to Services     [facility] campusTAMEKA : [place] Denney, jordon miguel, qaruddy yun. So [facility] [phone].    ATENCIÓN: Si habla español, tiene a hawley disposición servicios gratuitos de asistencia lingüística. Llame al [phone].    We comply with applicable federal civil " rights laws and [place] laws. We do not discriminate on the basis of race, color, national origin, age, disability, sex, sexual orientation, or gender identity.               Review of your medicines      UNREVIEWED medicines. Ask your doctor about these medicines        Dose / Directions    ALLEGRA PO        Refills:  0       BENADRYL PO        Dose:  50 mg   Take 50 mg by mouth every 6 hours as needed for allergies   Refills:  0       predniSONE 10 MG tablet   Commonly known as:  DELTASONE        15 day taper  40mg days #1, 2, 3 35mg days #4, 5, 6 30mg days #7, 8, 9 25mg days #10,11, 12 20mg days #13, 14, 15   Quantity:  32 tablet   Refills:  0       prenatal multivitamin plus iron 27-0.8 MG Tabs per tablet        Dose:  1 tablet   Take 1 tablet by mouth daily   Refills:  0       RANITIDINE HCL PO        Dose:  75 mg   Take 75 mg by mouth daily   Refills:  0       VISTARIL PO        Dose:  100 mg   Take 100 mg by mouth every 4 hours as needed for itching   Refills:  0                Protect others around you: Learn how to safely use, store and throw away your medicines at www.disposemymeds.org.             Medication List: This is a list of all your medications and when to take them. Check marks below indicate your daily home schedule. Keep this list as a reference.      Medications           Morning Afternoon Evening Bedtime As Needed    ALLEGRA PO                                BENADRYL PO   Take 50 mg by mouth every 6 hours as needed for allergies                                predniSONE 10 MG tablet   Commonly known as:  DELTASONE   15 day taper  40mg days #1, 2, 3 35mg days #4, 5, 6 30mg days #7, 8, 9 25mg days #10,11, 12 20mg days #13, 14, 15                                prenatal multivitamin plus iron 27-0.8 MG Tabs per tablet   Take 1 tablet by mouth daily                                RANITIDINE HCL PO   Take 75 mg by mouth daily                                VISTARIL PO   Take 100 mg by mouth  every 4 hours as needed for itching

## 2017-10-30 VITALS — DIASTOLIC BLOOD PRESSURE: 86 MMHG | TEMPERATURE: 98.6 F | RESPIRATION RATE: 18 BRPM | SYSTOLIC BLOOD PRESSURE: 135 MMHG

## 2017-10-30 NOTE — DISCHARGE INSTRUCTIONS
Discharge Instruction for Undelivered Patients      You were seen for: Labor Assessment  We Consulted: Dr. Regalado  You had (Test or Medicine):fetal monitoring, cervical exam     Diet:   Drink 8 to 12 glasses of liquids (milk, juice, water) every day.     Activity:  Call your doctor or nurse midwife if your baby is moving less than usual.     Call your provider if you notice:  Swelling in your face or increased swelling in your hands or legs.  Headaches that are not relieved by Tylenol (acetaminophen).  Changes in your vision (blurring: seeing spots or stars.)  Nausea (sick to your stomach) and vomiting (throwing up).   Weight gain of 5 pounds or more per week.  Heartburn that doesn't go away.  Signs of bladder infection: pain when you urinate (use the toilet), need to go more often and more urgently.  The bag of dougherty (rupture of membranes) breaks, or you notice leaking in your underwear.  Bright red blood in your underwear.  Abdominal (lower belly) or stomach pain.  Contractions (tightening) less than 10 minutes apart and getting stronger.  Increase or change in vaginal discharge (note the color and amount)    Follow-up:  As scheduled in the clinic

## 2017-10-30 NOTE — PLAN OF CARE
Patient arrived with mother to r/o labor.  States she has been feeling occasional painful contractions but has not been timing them.  Denies vaginal bleeding or leaking of fluid, states she is feeling baby move.  Patient is ]tearful.  States she is under a lot of stress, and that she bailed the FOB out of [facility] today then learned he is cheating on her.  Patient states she has had difficulty with eating and sleeping.  Patient is accompanied by 9 year old son and mother, who appears supportive of patient.  External monitors applied, health history obtained.

## 2017-10-30 NOTE — PROVIDER NOTIFICATION
10/29/17 2235   Provider Notification   Provider Name/Title Dr. Regalado   Method of Notification Phone   Request Evaluate - Remote   Notification Reason Patient Arrived   Orders received to discharge patient if cervix is unchanged.  PO ambien ordered for patient to take prior to discharge.

## 2017-10-30 NOTE — PLAN OF CARE
Discharge instructions reviewed.  Patient verbalized understanding.  States mother is going to drive her home and stay the night with her.  Discharged home at 2330.

## 2017-11-01 ENCOUNTER — HOSPITAL ENCOUNTER (INPATIENT)
Facility: CLINIC | Age: 42
LOS: 3 days | Discharge: HOME | End: 2017-11-04
Attending: OBSTETRICS & GYNECOLOGY | Admitting: OBSTETRICS & GYNECOLOGY
Payer: COMMERCIAL

## 2017-11-01 ENCOUNTER — ANESTHESIA EVENT (OUTPATIENT)
Dept: OBGYN | Facility: CLINIC | Age: 42
End: 2017-11-01
Payer: COMMERCIAL

## 2017-11-01 ENCOUNTER — ANESTHESIA (OUTPATIENT)
Dept: OBGYN | Facility: CLINIC | Age: 42
End: 2017-11-01
Payer: COMMERCIAL

## 2017-11-01 DIAGNOSIS — Z98.891 S/P CESAREAN SECTION: Primary | ICD-10-CM

## 2017-11-01 DIAGNOSIS — L50.9 HIVES: ICD-10-CM

## 2017-11-01 LAB
ABO + RH BLD: NORMAL
ALT SERPL W P-5'-P-CCNC: 8 U/L (ref 0–50)
AMPHETAMINES UR QL SCN: NEGATIVE
AST SERPL W P-5'-P-CCNC: 10 U/L (ref 0–45)
BASOPHILS # BLD AUTO: 0 10E9/L (ref 0–0.2)
BASOPHILS NFR BLD AUTO: 0.2 %
BLD GP AB SCN SERPL QL: NORMAL
BLOOD BANK CMNT PATIENT-IMP: NORMAL
CANNABINOIDS UR QL: NEGATIVE
COCAINE UR QL: NEGATIVE
CREAT UR-MCNC: 90 MG/DL
DIFFERENTIAL METHOD BLD: ABNORMAL
EOSINOPHIL # BLD AUTO: 0.2 10E9/L (ref 0–0.7)
EOSINOPHIL NFR BLD AUTO: 2.9 %
ERYTHROCYTE [DISTWIDTH] IN BLOOD BY AUTOMATED COUNT: 13.2 % (ref 10–15)
HCT VFR BLD AUTO: 32.6 % (ref 35–47)
HGB BLD-MCNC: 11.5 G/DL (ref 11.7–15.7)
IMM GRANULOCYTES # BLD: 0.1 10E9/L (ref 0–0.4)
IMM GRANULOCYTES NFR BLD: 0.7 %
LYMPHOCYTES # BLD AUTO: 1 10E9/L (ref 0.8–5.3)
LYMPHOCYTES NFR BLD AUTO: 12.2 %
MCH RBC QN AUTO: 32 PG (ref 26.5–33)
MCHC RBC AUTO-ENTMCNC: 35.3 G/DL (ref 31.5–36.5)
MCV RBC AUTO: 91 FL (ref 78–100)
MONOCYTES # BLD AUTO: 0.5 10E9/L (ref 0–1.3)
MONOCYTES NFR BLD AUTO: 5.8 %
NEUTROPHILS # BLD AUTO: 6.5 10E9/L (ref 1.6–8.3)
NEUTROPHILS NFR BLD AUTO: 78.2 %
NRBC # BLD AUTO: 0 10*3/UL
NRBC BLD AUTO-RTO: 0 /100
OPIATES UR QL SCN: NEGATIVE
PCP UR QL SCN: NEGATIVE
PLATELET # BLD AUTO: 218 10E9/L (ref 150–450)
PROT UR-MCNC: 0.14 G/L
PROT/CREAT 24H UR: 0.16 G/G CR (ref 0–0.2)
RBC # BLD AUTO: 3.59 10E12/L (ref 3.8–5.2)
SPECIMEN EXP DATE BLD: NORMAL
SPECIMEN EXP DATE BLD: NORMAL
T PALLIDUM IGG+IGM SER QL: NEGATIVE
URATE SERPL-MCNC: 4.9 MG/DL (ref 2.6–6)
WBC # BLD AUTO: 8.3 10E9/L (ref 4–11)

## 2017-11-01 PROCEDURE — 37000008 ZZH ANESTHESIA TECHNICAL FEE, 1ST 30 MIN: Performed by: OBSTETRICS & GYNECOLOGY

## 2017-11-01 PROCEDURE — 25000128 H RX IP 250 OP 636: Performed by: ANESTHESIOLOGY

## 2017-11-01 PROCEDURE — 25000125 ZZHC RX 250: Performed by: ANESTHESIOLOGY

## 2017-11-01 PROCEDURE — 25000125 ZZHC RX 250: Performed by: NURSE ANESTHETIST, CERTIFIED REGISTERED

## 2017-11-01 PROCEDURE — 84550 ASSAY OF BLOOD/URIC ACID: CPT | Performed by: OBSTETRICS & GYNECOLOGY

## 2017-11-01 PROCEDURE — 36000058 ZZH SURGERY LEVEL 3 EA 15 ADDTL MIN: Performed by: OBSTETRICS & GYNECOLOGY

## 2017-11-01 PROCEDURE — 25000128 H RX IP 250 OP 636: Performed by: OBSTETRICS & GYNECOLOGY

## 2017-11-01 PROCEDURE — 27210794 ZZH OR GENERAL SUPPLY STERILE: Performed by: OBSTETRICS & GYNECOLOGY

## 2017-11-01 PROCEDURE — 36000056 ZZH SURGERY LEVEL 3 1ST 30 MIN: Performed by: OBSTETRICS & GYNECOLOGY

## 2017-11-01 PROCEDURE — 12000031 ZZH R&B OB CRITICAL

## 2017-11-01 PROCEDURE — 37000009 ZZH ANESTHESIA TECHNICAL FEE, EACH ADDTL 15 MIN: Performed by: OBSTETRICS & GYNECOLOGY

## 2017-11-01 PROCEDURE — 25000132 ZZH RX MED GY IP 250 OP 250 PS 637: Performed by: OBSTETRICS & GYNECOLOGY

## 2017-11-01 PROCEDURE — 25000128 H RX IP 250 OP 636: Performed by: NURSE ANESTHETIST, CERTIFIED REGISTERED

## 2017-11-01 PROCEDURE — 86780 TREPONEMA PALLIDUM: CPT | Performed by: OBSTETRICS & GYNECOLOGY

## 2017-11-01 PROCEDURE — 25000125 ZZHC RX 250: Performed by: OBSTETRICS & GYNECOLOGY

## 2017-11-01 PROCEDURE — 86900 BLOOD TYPING SEROLOGIC ABO: CPT | Performed by: OBSTETRICS & GYNECOLOGY

## 2017-11-01 PROCEDURE — 80307 DRUG TEST PRSMV CHEM ANLYZR: CPT | Performed by: OBSTETRICS & GYNECOLOGY

## 2017-11-01 PROCEDURE — 86901 BLOOD TYPING SEROLOGIC RH(D): CPT | Performed by: OBSTETRICS & GYNECOLOGY

## 2017-11-01 PROCEDURE — 37000011 ZZH ANESTHESIA WARD SERVICE

## 2017-11-01 PROCEDURE — 25000128 H RX IP 250 OP 636

## 2017-11-01 PROCEDURE — 84460 ALANINE AMINO (ALT) (SGPT): CPT | Performed by: OBSTETRICS & GYNECOLOGY

## 2017-11-01 PROCEDURE — 40000671 ZZH STATISTIC ANESTHESIA CASE

## 2017-11-01 PROCEDURE — 84450 TRANSFERASE (AST) (SGOT): CPT | Performed by: OBSTETRICS & GYNECOLOGY

## 2017-11-01 PROCEDURE — 71000012 ZZH RECOVERY PHASE 1 LEVEL 1 FIRST HR: Performed by: OBSTETRICS & GYNECOLOGY

## 2017-11-01 PROCEDURE — 86850 RBC ANTIBODY SCREEN: CPT | Performed by: OBSTETRICS & GYNECOLOGY

## 2017-11-01 PROCEDURE — 85025 COMPLETE CBC W/AUTO DIFF WBC: CPT | Performed by: OBSTETRICS & GYNECOLOGY

## 2017-11-01 PROCEDURE — 84156 ASSAY OF PROTEIN URINE: CPT | Performed by: OBSTETRICS & GYNECOLOGY

## 2017-11-01 RX ORDER — AMOXICILLIN 250 MG
1-2 CAPSULE ORAL 2 TIMES DAILY
Status: DISCONTINUED | OUTPATIENT
Start: 2017-11-01 | End: 2017-11-04 | Stop reason: HOSPADM

## 2017-11-01 RX ORDER — BISACODYL 10 MG
10 SUPPOSITORY, RECTAL RECTAL DAILY PRN
Status: DISCONTINUED | OUTPATIENT
Start: 2017-11-03 | End: 2017-11-04 | Stop reason: HOSPADM

## 2017-11-01 RX ORDER — DIPHENHYDRAMINE HCL 25 MG
25 CAPSULE ORAL EVERY 6 HOURS PRN
Status: DISCONTINUED | OUTPATIENT
Start: 2017-11-01 | End: 2017-11-04 | Stop reason: HOSPADM

## 2017-11-01 RX ORDER — METHYLERGONOVINE MALEATE 0.2 MG/ML
200 INJECTION INTRAVENOUS
Status: DISCONTINUED | OUTPATIENT
Start: 2017-11-01 | End: 2017-11-01

## 2017-11-01 RX ORDER — CEFAZOLIN SODIUM 1 G/3ML
1 INJECTION, POWDER, FOR SOLUTION INTRAMUSCULAR; INTRAVENOUS SEE ADMIN INSTRUCTIONS
Status: DISCONTINUED | OUTPATIENT
Start: 2017-11-01 | End: 2017-11-01

## 2017-11-01 RX ORDER — CARBOPROST TROMETHAMINE 250 UG/ML
250 INJECTION, SOLUTION INTRAMUSCULAR
Status: DISCONTINUED | OUTPATIENT
Start: 2017-11-01 | End: 2017-11-01

## 2017-11-01 RX ORDER — OXYTOCIN 10 [USP'U]/ML
10 INJECTION, SOLUTION INTRAMUSCULAR; INTRAVENOUS
Status: DISCONTINUED | OUTPATIENT
Start: 2017-11-01 | End: 2017-11-04 | Stop reason: HOSPADM

## 2017-11-01 RX ORDER — LIDOCAINE 40 MG/G
CREAM TOPICAL
Status: DISCONTINUED | OUTPATIENT
Start: 2017-11-01 | End: 2017-11-04 | Stop reason: HOSPADM

## 2017-11-01 RX ORDER — OXYCODONE HYDROCHLORIDE 5 MG/1
5-10 TABLET ORAL
Status: DISCONTINUED | OUTPATIENT
Start: 2017-11-01 | End: 2017-11-04 | Stop reason: HOSPADM

## 2017-11-01 RX ORDER — ONDANSETRON 2 MG/ML
4 INJECTION INTRAMUSCULAR; INTRAVENOUS EVERY 6 HOURS PRN
Status: DISCONTINUED | OUTPATIENT
Start: 2017-11-01 | End: 2017-11-04 | Stop reason: HOSPADM

## 2017-11-01 RX ORDER — METOCLOPRAMIDE HYDROCHLORIDE 5 MG/ML
10 INJECTION INTRAMUSCULAR; INTRAVENOUS EVERY 6 HOURS PRN
Status: DISCONTINUED | OUTPATIENT
Start: 2017-11-01 | End: 2017-11-04 | Stop reason: HOSPADM

## 2017-11-01 RX ORDER — OXYTOCIN/0.9 % SODIUM CHLORIDE 30/500 ML
100-340 PLASTIC BAG, INJECTION (ML) INTRAVENOUS CONTINUOUS PRN
Status: DISCONTINUED | OUTPATIENT
Start: 2017-11-01 | End: 2017-11-01

## 2017-11-01 RX ORDER — MORPHINE SULFATE 1 MG/ML
INJECTION, SOLUTION EPIDURAL; INTRATHECAL; INTRAVENOUS PRN
Status: DISCONTINUED | OUTPATIENT
Start: 2017-11-01 | End: 2017-11-01

## 2017-11-01 RX ORDER — LIDOCAINE HCL/EPINEPHRINE/PF 2%-1:200K
VIAL (ML) INJECTION PRN
Status: DISCONTINUED | OUTPATIENT
Start: 2017-11-01 | End: 2017-11-01

## 2017-11-01 RX ORDER — FENTANYL CITRATE 50 UG/ML
25-50 INJECTION, SOLUTION INTRAMUSCULAR; INTRAVENOUS
Status: DISCONTINUED | OUTPATIENT
Start: 2017-11-01 | End: 2017-11-01

## 2017-11-01 RX ORDER — OXYMETAZOLINE HYDROCHLORIDE 0.05 G/100ML
SPRAY NASAL PRN
Status: DISCONTINUED | OUTPATIENT
Start: 2017-11-01 | End: 2017-11-01

## 2017-11-01 RX ORDER — OXYTOCIN/0.9 % SODIUM CHLORIDE 30/500 ML
100 PLASTIC BAG, INJECTION (ML) INTRAVENOUS CONTINUOUS
Status: DISCONTINUED | OUTPATIENT
Start: 2017-11-01 | End: 2017-11-04 | Stop reason: CLARIF

## 2017-11-01 RX ORDER — ONDANSETRON 2 MG/ML
4 INJECTION INTRAMUSCULAR; INTRAVENOUS EVERY 6 HOURS PRN
Status: DISCONTINUED | OUTPATIENT
Start: 2017-11-01 | End: 2017-11-01

## 2017-11-01 RX ORDER — EPHEDRINE SULFATE 50 MG/ML
5 INJECTION, SOLUTION INTRAMUSCULAR; INTRAVENOUS; SUBCUTANEOUS
Status: DISCONTINUED | OUTPATIENT
Start: 2017-11-01 | End: 2017-11-01

## 2017-11-01 RX ORDER — PROMETHAZINE HYDROCHLORIDE 25 MG/ML
6.25 INJECTION, SOLUTION INTRAMUSCULAR; INTRAVENOUS
Status: DISCONTINUED | OUTPATIENT
Start: 2017-11-01 | End: 2017-11-01

## 2017-11-01 RX ORDER — DIPHENHYDRAMINE HYDROCHLORIDE 50 MG/ML
25 INJECTION INTRAMUSCULAR; INTRAVENOUS EVERY 6 HOURS PRN
Status: DISCONTINUED | OUTPATIENT
Start: 2017-11-01 | End: 2017-11-04 | Stop reason: HOSPADM

## 2017-11-01 RX ORDER — SCOLOPAMINE TRANSDERMAL SYSTEM 1 MG/1
1 PATCH, EXTENDED RELEASE TRANSDERMAL ONCE
Status: DISCONTINUED | OUTPATIENT
Start: 2017-11-01 | End: 2017-11-01

## 2017-11-01 RX ORDER — SODIUM CHLORIDE, SODIUM LACTATE, POTASSIUM CHLORIDE, CALCIUM CHLORIDE 600; 310; 30; 20 MG/100ML; MG/100ML; MG/100ML; MG/100ML
INJECTION, SOLUTION INTRAVENOUS CONTINUOUS
Status: DISCONTINUED | OUTPATIENT
Start: 2017-11-01 | End: 2017-11-01

## 2017-11-01 RX ORDER — NALOXONE HYDROCHLORIDE 0.4 MG/ML
.1-.4 INJECTION, SOLUTION INTRAMUSCULAR; INTRAVENOUS; SUBCUTANEOUS
Status: DISCONTINUED | OUTPATIENT
Start: 2017-11-01 | End: 2017-11-01

## 2017-11-01 RX ORDER — LIDOCAINE 40 MG/G
CREAM TOPICAL
Status: DISCONTINUED | OUTPATIENT
Start: 2017-11-01 | End: 2017-11-01

## 2017-11-01 RX ORDER — IBUPROFEN 800 MG/1
800 TABLET, FILM COATED ORAL
Status: DISCONTINUED | OUTPATIENT
Start: 2017-11-01 | End: 2017-11-01

## 2017-11-01 RX ORDER — NALBUPHINE HYDROCHLORIDE 10 MG/ML
10 INJECTION, SOLUTION INTRAMUSCULAR; INTRAVENOUS; SUBCUTANEOUS ONCE
Status: COMPLETED | OUTPATIENT
Start: 2017-11-01 | End: 2017-11-01

## 2017-11-01 RX ORDER — CITRIC ACID/SODIUM CITRATE 334-500MG
30 SOLUTION, ORAL ORAL
Status: COMPLETED | OUTPATIENT
Start: 2017-11-01 | End: 2017-11-01

## 2017-11-01 RX ORDER — IBUPROFEN 400 MG/1
400-800 TABLET, FILM COATED ORAL EVERY 6 HOURS PRN
Status: DISCONTINUED | OUTPATIENT
Start: 2017-11-01 | End: 2017-11-04 | Stop reason: HOSPADM

## 2017-11-01 RX ORDER — HYDROMORPHONE HYDROCHLORIDE 1 MG/ML
.3-.5 INJECTION, SOLUTION INTRAMUSCULAR; INTRAVENOUS; SUBCUTANEOUS EVERY 10 MIN PRN
Status: DISCONTINUED | OUTPATIENT
Start: 2017-11-01 | End: 2017-11-01

## 2017-11-01 RX ORDER — LIDOCAINE HYDROCHLORIDE 20 MG/ML
INJECTION, SOLUTION INFILTRATION; PERINEURAL PRN
Status: DISCONTINUED | OUTPATIENT
Start: 2017-11-01 | End: 2017-11-01

## 2017-11-01 RX ORDER — ACETAMINOPHEN 325 MG/1
975 TABLET ORAL EVERY 8 HOURS
Status: COMPLETED | OUTPATIENT
Start: 2017-11-01 | End: 2017-11-04

## 2017-11-01 RX ORDER — ONDANSETRON 2 MG/ML
4 INJECTION INTRAMUSCULAR; INTRAVENOUS EVERY 30 MIN PRN
Status: DISCONTINUED | OUTPATIENT
Start: 2017-11-01 | End: 2017-11-01

## 2017-11-01 RX ORDER — ONDANSETRON 2 MG/ML
INJECTION INTRAMUSCULAR; INTRAVENOUS PRN
Status: DISCONTINUED | OUTPATIENT
Start: 2017-11-01 | End: 2017-11-01

## 2017-11-01 RX ORDER — NALBUPHINE HYDROCHLORIDE 10 MG/ML
2.5-5 INJECTION, SOLUTION INTRAMUSCULAR; INTRAVENOUS; SUBCUTANEOUS EVERY 6 HOURS PRN
Status: DISCONTINUED | OUTPATIENT
Start: 2017-11-01 | End: 2017-11-01

## 2017-11-01 RX ORDER — MEPERIDINE HYDROCHLORIDE 50 MG/ML
12.5 INJECTION INTRAMUSCULAR; INTRAVENOUS; SUBCUTANEOUS
Status: DISCONTINUED | OUTPATIENT
Start: 2017-11-01 | End: 2017-11-01

## 2017-11-01 RX ORDER — PROCHLORPERAZINE 25 MG
25 SUPPOSITORY, RECTAL RECTAL EVERY 12 HOURS PRN
Status: DISCONTINUED | OUTPATIENT
Start: 2017-11-01 | End: 2017-11-04 | Stop reason: HOSPADM

## 2017-11-01 RX ORDER — DEXTROSE, SODIUM CHLORIDE, SODIUM LACTATE, POTASSIUM CHLORIDE, AND CALCIUM CHLORIDE 5; .6; .31; .03; .02 G/100ML; G/100ML; G/100ML; G/100ML; G/100ML
INJECTION, SOLUTION INTRAVENOUS CONTINUOUS
Status: DISCONTINUED | OUTPATIENT
Start: 2017-11-01 | End: 2017-11-04 | Stop reason: CLARIF

## 2017-11-01 RX ORDER — NALOXONE HYDROCHLORIDE 0.4 MG/ML
.1-.4 INJECTION, SOLUTION INTRAMUSCULAR; INTRAVENOUS; SUBCUTANEOUS
Status: DISCONTINUED | OUTPATIENT
Start: 2017-11-01 | End: 2017-11-04 | Stop reason: HOSPADM

## 2017-11-01 RX ORDER — PREDNISONE 5 MG/1
5 TABLET ORAL ONCE
Status: COMPLETED | OUTPATIENT
Start: 2017-11-01 | End: 2017-11-01

## 2017-11-01 RX ORDER — FENTANYL CITRATE 50 UG/ML
50-100 INJECTION, SOLUTION INTRAMUSCULAR; INTRAVENOUS
Status: DISCONTINUED | OUTPATIENT
Start: 2017-11-01 | End: 2017-11-01

## 2017-11-01 RX ORDER — ACETAMINOPHEN 325 MG/1
650 TABLET ORAL EVERY 4 HOURS PRN
Status: DISCONTINUED | OUTPATIENT
Start: 2017-11-01 | End: 2017-11-01

## 2017-11-01 RX ORDER — ALBUTEROL SULFATE 0.83 MG/ML
2.5 SOLUTION RESPIRATORY (INHALATION) EVERY 4 HOURS PRN
Status: DISCONTINUED | OUTPATIENT
Start: 2017-11-01 | End: 2017-11-01

## 2017-11-01 RX ORDER — ACETAMINOPHEN 325 MG/1
650 TABLET ORAL EVERY 4 HOURS PRN
Status: DISCONTINUED | OUTPATIENT
Start: 2017-11-04 | End: 2017-11-04 | Stop reason: HOSPADM

## 2017-11-01 RX ORDER — OXYTOCIN/0.9 % SODIUM CHLORIDE 30/500 ML
PLASTIC BAG, INJECTION (ML) INTRAVENOUS PRN
Status: DISCONTINUED | OUTPATIENT
Start: 2017-11-01 | End: 2017-11-01

## 2017-11-01 RX ORDER — HYDROCORTISONE 2.5 %
CREAM (GRAM) TOPICAL 3 TIMES DAILY PRN
Status: DISCONTINUED | OUTPATIENT
Start: 2017-11-01 | End: 2017-11-04 | Stop reason: HOSPADM

## 2017-11-01 RX ORDER — OXYTOCIN 10 [USP'U]/ML
10 INJECTION, SOLUTION INTRAMUSCULAR; INTRAVENOUS
Status: DISCONTINUED | OUTPATIENT
Start: 2017-11-01 | End: 2017-11-01

## 2017-11-01 RX ORDER — OXYCODONE AND ACETAMINOPHEN 5; 325 MG/1; MG/1
1 TABLET ORAL
Status: DISCONTINUED | OUTPATIENT
Start: 2017-11-01 | End: 2017-11-01

## 2017-11-01 RX ORDER — KETOROLAC TROMETHAMINE 30 MG/ML
15 INJECTION, SOLUTION INTRAMUSCULAR; INTRAVENOUS EVERY 6 HOURS
Status: ACTIVE | OUTPATIENT
Start: 2017-11-01 | End: 2017-11-02

## 2017-11-01 RX ORDER — ONDANSETRON 4 MG/1
4 TABLET, ORALLY DISINTEGRATING ORAL EVERY 30 MIN PRN
Status: DISCONTINUED | OUTPATIENT
Start: 2017-11-01 | End: 2017-11-01

## 2017-11-01 RX ORDER — EPHEDRINE SULFATE 50 MG/ML
INJECTION, SOLUTION INTRAVENOUS PRN
Status: DISCONTINUED | OUTPATIENT
Start: 2017-11-01 | End: 2017-11-01

## 2017-11-01 RX ORDER — LANOLIN 100 %
OINTMENT (GRAM) TOPICAL
Status: DISCONTINUED | OUTPATIENT
Start: 2017-11-01 | End: 2017-11-04 | Stop reason: HOSPADM

## 2017-11-01 RX ORDER — HYDROMORPHONE HYDROCHLORIDE 1 MG/ML
INJECTION, SOLUTION INTRAMUSCULAR; INTRAVENOUS; SUBCUTANEOUS
Status: COMPLETED
Start: 2017-11-01 | End: 2017-11-01

## 2017-11-01 RX ORDER — OXYTOCIN/0.9 % SODIUM CHLORIDE 30/500 ML
1-24 PLASTIC BAG, INJECTION (ML) INTRAVENOUS CONTINUOUS
Status: DISCONTINUED | OUTPATIENT
Start: 2017-11-01 | End: 2017-11-01

## 2017-11-01 RX ORDER — SIMETHICONE 80 MG
80 TABLET,CHEWABLE ORAL 4 TIMES DAILY PRN
Status: DISCONTINUED | OUTPATIENT
Start: 2017-11-01 | End: 2017-11-04 | Stop reason: HOSPADM

## 2017-11-01 RX ORDER — OXYTOCIN/0.9 % SODIUM CHLORIDE 30/500 ML
340 PLASTIC BAG, INJECTION (ML) INTRAVENOUS CONTINUOUS PRN
Status: DISCONTINUED | OUTPATIENT
Start: 2017-11-01 | End: 2017-11-04 | Stop reason: HOSPADM

## 2017-11-01 RX ORDER — CEFAZOLIN SODIUM 2 G/100ML
2 INJECTION, SOLUTION INTRAVENOUS
Status: COMPLETED | OUTPATIENT
Start: 2017-11-01 | End: 2017-11-01

## 2017-11-01 RX ORDER — NICOTINE 21 MG/24HR
1 PATCH, TRANSDERMAL 24 HOURS TRANSDERMAL DAILY
Status: DISCONTINUED | OUTPATIENT
Start: 2017-11-01 | End: 2017-11-03

## 2017-11-01 RX ORDER — MISOPROSTOL 200 UG/1
800 TABLET ORAL
Status: DISCONTINUED | OUTPATIENT
Start: 2017-11-01 | End: 2017-11-04 | Stop reason: HOSPADM

## 2017-11-01 RX ORDER — HYDROMORPHONE HYDROCHLORIDE 1 MG/ML
.3-.5 INJECTION, SOLUTION INTRAMUSCULAR; INTRAVENOUS; SUBCUTANEOUS EVERY 30 MIN PRN
Status: DISCONTINUED | OUTPATIENT
Start: 2017-11-01 | End: 2017-11-04 | Stop reason: HOSPADM

## 2017-11-01 RX ADMIN — SODIUM CITRATE AND CITRIC ACID MONOHYDRATE 30 ML: 500; 334 SOLUTION ORAL at 16:21

## 2017-11-01 RX ADMIN — SODIUM CHLORIDE, POTASSIUM CHLORIDE, SODIUM LACTATE AND CALCIUM CHLORIDE: 600; 310; 30; 20 INJECTION, SOLUTION INTRAVENOUS at 17:22

## 2017-11-01 RX ADMIN — OXYTOCIN-SODIUM CHLORIDE 0.9% IV SOLN 30 UNIT/500ML 500 ML: 30-0.9/5 SOLUTION at 16:56

## 2017-11-01 RX ADMIN — SODIUM CHLORIDE, POTASSIUM CHLORIDE, SODIUM LACTATE AND CALCIUM CHLORIDE: 600; 310; 30; 20 INJECTION, SOLUTION INTRAVENOUS at 09:19

## 2017-11-01 RX ADMIN — LIDOCAINE HYDROCHLORIDE,EPINEPHRINE BITARTRATE 5 ML: 20; .005 INJECTION, SOLUTION EPIDURAL; INFILTRATION; INTRACAUDAL; PERINEURAL at 16:30

## 2017-11-01 RX ADMIN — KETOROLAC TROMETHAMINE 15 MG: 30 INJECTION, SOLUTION INTRAMUSCULAR at 23:37

## 2017-11-01 RX ADMIN — LIDOCAINE HYDROCHLORIDE 5 ML: 20 INJECTION, SOLUTION INFILTRATION; PERINEURAL at 16:39

## 2017-11-01 RX ADMIN — CEFAZOLIN SODIUM 2 G: 2 INJECTION, SOLUTION INTRAVENOUS at 16:40

## 2017-11-01 RX ADMIN — MORPHINE SULFATE 3 MG: 1 INJECTION EPIDURAL; INTRATHECAL; INTRAVENOUS at 16:58

## 2017-11-01 RX ADMIN — OXYTOCIN-SODIUM CHLORIDE 0.9% IV SOLN 30 UNIT/500ML 2 MILLI-UNITS/MIN: 30-0.9/5 SOLUTION at 09:20

## 2017-11-01 RX ADMIN — SODIUM CHLORIDE, POTASSIUM CHLORIDE, SODIUM LACTATE AND CALCIUM CHLORIDE: 600; 310; 30; 20 INJECTION, SOLUTION INTRAVENOUS at 11:07

## 2017-11-01 RX ADMIN — ONDANSETRON 4 MG: 2 INJECTION INTRAMUSCULAR; INTRAVENOUS at 17:27

## 2017-11-01 RX ADMIN — DIPHENHYDRAMINE HYDROCHLORIDE 25 MG: 50 INJECTION, SOLUTION INTRAMUSCULAR; INTRAVENOUS at 18:26

## 2017-11-01 RX ADMIN — ACETAMINOPHEN 975 MG: 325 TABLET, FILM COATED ORAL at 19:35

## 2017-11-01 RX ADMIN — HYDROMORPHONE HYDROCHLORIDE 0.5 MG: 1 INJECTION, SOLUTION INTRAMUSCULAR; INTRAVENOUS; SUBCUTANEOUS at 13:00

## 2017-11-01 RX ADMIN — EPHEDRINE SULFATE 5 MG: 50 INJECTION, SOLUTION INTRAVENOUS at 17:26

## 2017-11-01 RX ADMIN — Medication 15 ML/HR: at 13:00

## 2017-11-01 RX ADMIN — FENTANYL CITRATE 100 MCG: 50 INJECTION INTRAMUSCULAR; INTRAVENOUS at 11:13

## 2017-11-01 RX ADMIN — HYDROMORPHONE HYDROCHLORIDE 0.5 MG: 1 INJECTION, SOLUTION INTRAMUSCULAR; INTRAVENOUS; SUBCUTANEOUS at 13:04

## 2017-11-01 RX ADMIN — SALINE NASAL SPRAY 2 SPRAY: 1.5 SOLUTION NASAL at 10:15

## 2017-11-01 RX ADMIN — SODIUM CHLORIDE, POTASSIUM CHLORIDE, SODIUM LACTATE AND CALCIUM CHLORIDE: 600; 310; 30; 20 INJECTION, SOLUTION INTRAVENOUS at 16:11

## 2017-11-01 RX ADMIN — FENTANYL CITRATE 100 MCG: 50 INJECTION INTRAMUSCULAR; INTRAVENOUS at 12:09

## 2017-11-01 RX ADMIN — PREDNISONE 5 MG: 5 TABLET ORAL at 18:46

## 2017-11-01 RX ADMIN — EPHEDRINE SULFATE 10 MG: 50 INJECTION, SOLUTION INTRAVENOUS at 17:06

## 2017-11-01 RX ADMIN — OXYMETAZOLINE HYDROCHLORIDE 2 SPRAY: 5 SPRAY NASAL at 17:01

## 2017-11-01 RX ADMIN — SODIUM CHLORIDE, SODIUM LACTATE, POTASSIUM CHLORIDE, CALCIUM CHLORIDE AND DEXTROSE MONOHYDRATE: 5; 600; 310; 30; 20 INJECTION, SOLUTION INTRAVENOUS at 23:37

## 2017-11-01 RX ADMIN — SODIUM CHLORIDE, POTASSIUM CHLORIDE, SODIUM LACTATE AND CALCIUM CHLORIDE: 600; 310; 30; 20 INJECTION, SOLUTION INTRAVENOUS at 14:56

## 2017-11-01 RX ADMIN — NICOTINE 1 PATCH: 14 PATCH, EXTENDED RELEASE TRANSDERMAL at 19:36

## 2017-11-01 RX ADMIN — NALBUPHINE HYDROCHLORIDE 10 MG: 10 INJECTION, SOLUTION INTRAMUSCULAR; INTRAVENOUS; SUBCUTANEOUS at 21:26

## 2017-11-01 RX ADMIN — LIDOCAINE HYDROCHLORIDE,EPINEPHRINE BITARTRATE 5 ML: 20; .005 INJECTION, SOLUTION EPIDURAL; INFILTRATION; INTRACAUDAL; PERINEURAL at 16:39

## 2017-11-01 RX ADMIN — LIDOCAINE HYDROCHLORIDE 5 ML: 20 INJECTION, SOLUTION INFILTRATION; PERINEURAL at 16:30

## 2017-11-01 RX ADMIN — Medication 10 ML: at 13:05

## 2017-11-01 ASSESSMENT — LIFESTYLE VARIABLES: TOBACCO_USE: 1

## 2017-11-01 NOTE — PROVIDER NOTIFICATION
11/01/17 1400   Provider Notification   Provider Name/Title Dr. Sunshine   Method of Notification At Bedside   Request Evaluate in Person   Notification Reason Status Update   MD at bedside for PD and LD.  MD updated on labor interventions.  MD reviewed FHT's and contraction pattern.  MD also placed new FSE.  Orders received to plan to restart pitocin if LD resolve.  MD updated on tachycardia as well.

## 2017-11-01 NOTE — ANESTHESIA CARE TRANSFER NOTE
Patient: Maddi Muller    Procedure(s):   - Wound Class: II-Clean Contaminated    Diagnosis: pregnancy  Diagnosis Additional Information: No value filed.    Anesthesia Type:   Epidural     Note:  Airway :Room Air  Patient transferred to:Labor and Delivery  Comments: Migue Report: Identifed the Patient, Identified the Reponsible Provider, Reviewed the pertinent medical history, Discussed the surgical course, Reviewed Intra-OP anesthesia mangement and issues during anesthesia, Set expectations for post-procedure period and Allowed opportunity for questions and acknowledgement of understanding      Vitals: (Last set prior to Anesthesia Care Transfer)    CRNA VITALS  11/1/2017 1656 - 11/1/2017 1731      11/1/2017             Pulse: 79    SpO2: 97 %                Electronically Signed By: WESLEY Lo CRNA  November 1, 2017  5:31 PM

## 2017-11-01 NOTE — PROVIDER NOTIFICATION
11/01/17 1545   Provider Notification   Provider Name/Title Dr. Sunshine   Method of Notification In Department   Request Evaluate - Remote   Notification Reason Status Update   MD updated on LD with labor interventions.

## 2017-11-01 NOTE — PROVIDER NOTIFICATION
17 1604   Provider Notification   Provider Name/Title Dr. Sunshine   Method of Notification At Bedside   Request Evaluate in Person   Notification Reason Status Update   MD updated on pitocin d/c for LD.  MD performed SVE and discussed  section for fetal intolerance to labor per MD.  Patient agrees to proceed with  section.

## 2017-11-01 NOTE — PROVIDER NOTIFICATION
11/01/17 0759   Provider Notification   Provider Name/Title Dr. Sunshine   Method of Notification In Department   Request Evaluate - Remote   Notification Reason Status Update   MD updated on patient arrival, patient hx of positive methamphetamine and amphetamines, so will obtain tox screen.  MD also updated on patient recently having elevated BP's in clinic and PIH labs performed in clinic.  Orders received to repeat PIH labs if blood pressures elevated and proceed with tox screen.  MD also updated on patient having hives present that has been chronic throughout this pregnancy and before pregnancy with patient taking 20-40mg of prednisone at home per MD for hives and patient requesting 40 mg of prednisone currently. MD will plan to come speak with patient about risk of prednisone and will not order it at this time. Orders received for intrapartum admission, induction orders, perform SVE and being pitocin. MD will plan to come around 0930 to perform SVE and attempt AROM possible.

## 2017-11-01 NOTE — ANESTHESIA POSTPROCEDURE EVALUATION
Patient: Maddi Muller    Procedure(s):   - Wound Class: II-Clean Contaminated    Diagnosis:pregnancy  Diagnosis Additional Information: 40w3d gestation, recurrent late decelerations, AMA, drug screen positive in pregnancy, tobacco use in pregnancy, myasthenia gravis, obesity    Anesthesia Type:  Epidural    Note:  Anesthesia Post Evaluation    Patient location during evaluation: PACU  Patient participation: Able to fully participate in evaluation  Level of consciousness: awake  Pain management: adequate  Airway patency: patent  Cardiovascular status: acceptable  Respiratory status: acceptable  Hydration status: acceptable  PONV: controlled     Anesthetic complications: None    Comments: .Anticipate full return of neurologic function          Last vitals:  Vitals:    11/01/17 1610 11/01/17 1615 11/01/17 1620   BP: 120/63 120/63    Pulse:      Resp:      Temp:      SpO2: 99% 97% 97%         Electronically Signed By: Rigo Wilson DO  November 1, 2017  5:38 PM

## 2017-11-01 NOTE — PROVIDER NOTIFICATION
11/01/17 0907   Provider Notification   Provider Name/Title Dr. Sunshine   Method of Notification Phone   Request Evaluate - Remote   Notification Reason Status Update   MD updated on PD X1. MD also updated on contraction pattern, SVE, and molina score. Orders received to begin pitocin augmentation and MD will come attempt AROM.

## 2017-11-01 NOTE — PROVIDER NOTIFICATION
11/01/17 1231   Provider Notification   Provider Name/Title Dr. Sunshine   Method of Notification In Department   Request Evaluate - Remote   Notification Reason Status Update   MD updated on FHT's with possible decel and contractions now difficult to monitor. Orders received to place IUPC and place FSE if needed.

## 2017-11-01 NOTE — IP AVS SNAPSHOT
[facility]    [address]    [facility] [address]    Phone:  [phone]    Fax:  [phone]                                       After Visit Summary   11/1/2017    Maddi Muller    MRN: [id number]           After Visit Summary Signature Page     I have received my discharge instructions, and my questions have been answered. I have discussed any challenges I see with this plan with the nurse or doctor.    ..........................................................................................................................................  Patient/Patient Representative Signature      ..........................................................................................................................................  Patient Representative Print Name and Relationship to Patient    ..................................................               ................................................  Date                                            Time    ..........................................................................................................................................  Reviewed by Signature/Title    ...................................................              ..............................................  Date                                                            Time

## 2017-11-01 NOTE — PROVIDER NOTIFICATION
17 0945   Provider Notification   Provider Name/Title Dr. Sunshine   Method of Notification At Bedside   Request Evaluate in Person   Notification Reason Status Update   MD updated on FHT's, contraction pattern, pitocin started.  MD performed SVE and AROM.  MD discussed will not prescribe prednisone for risk of . MD also updated on nasal congestion orders received to just use ocean spray normal saline spray.

## 2017-11-01 NOTE — IP AVS SNAPSHOT
MRN:[id number]                      After Visit Summary   2017    Maddi Muller    MRN: [id number]           Thank you!     Thank you for choosing [facility] for your care. Our goal is always to provide you with excellent care. Hearing back from our patients is one way we can continue to improve our services. Please take a few minutes to complete the written survey that you may receive in the mail after you visit. If you would like to speak to someone directly about your visit please contact Patient Relations at [phone]. Thank you!          Patient Information     Date Of Birth          1975        Designated Caregiver       Most Recent Value    Caregiver    Will someone help with your care after discharge? no      About your hospital stay     You were admitted on:  2017 You last received care in the:  [facility] Postpartum    You were discharged on:  2017        Reason for your hospital stay       Maternity care                  Who to Call     For medical emergencies, please call 911.  For non-urgent questions about your medical care, please call your primary care provider or clinic, [phone]  For questions related to your surgery, please call your surgery clinic        Attending Provider     Provider Specialty    Jayla Sunshien MD OB/Gyn       Primary Care Provider Office Phone # Fax #    Pedro Collins [phone]      After Care Instructions     Activity       Review discharge instructions            Diet       Resume previous diet            Discharge Instructions - Postpartum visit       Schedule postpartum visit with your provider and return to clinic in 6 weeks.                  Further instructions from your care team       Postop  Birth Instructions  Please make an appointment to follow up with your primary OB in clinic in 6 weeks.    [facility] Lactation Consultant:   [phone]    Activity       Do not lift more than 10 pounds for 6 weeks after surgery.  Ask family and friends for help when you need it.    No driving until you have stopped taking your pain medications (usually two weeks after surgery).    No heavy exercise or activity for 6 weeks.  Don't do anything that will put a strain on your surgery site.    Don't strain when using the toilet.  Your care team may prescribe a stool softener if you have problems with your bowel movements.     To care for your incision:       Keep the incision clean and dry.    Do not soak your incision in water. No swimming or hot tubs until it has fully healed. You may soak in the bathtub if the water level is below your incision.    Do not use peroxide, gel, cream, lotion, or ointment on your incision.    Adjust your clothes to avoid pressure on your surgery site (check the elastic in your underwear for example).     You may see a small amount of clear or pink drainage and this is normal.  Check with your health care provider:       If the drainage increases or has an odor.    If the incision reddens, you have swelling, or develop a rash.    If you have increased pain and the medicine we prescribed doesn't help.    If you have a fever above 100.4 F (38 C) with or without chills when placing thermometer under your tongue.   The area around your incision (surgery wound), will feel numb.  This is normal. The numbness should go away in less than a year.     Keep your hands clean:  Always wash your hands before touching your incision (surgery wound). This helps reduce your risk of infection. If your hands aren't dirty, you may use an alcohol hand-rub to clean your hands. Keep your nails clean and short.    Call your healthcare provider if you have any of these symptoms:       You soak a sanitary pad with blood within 1 hour, or you see blood clots larger than a golf ball.    Bleeding that lasts more than 6 weeks.    Vaginal discharge that  "smells bad.    Severe pain, cramping or tenderness in your lower belly area.    A need to urinate more frequently (use the toilet more often), more urgently (use the toilet very quickly), or it burns when you urinate.    Nausea and vomiting.    Redness, swelling or pain around a vein in your leg.    Problems breastfeeding or a red or painful area on your breast.    Chest pain and cough or are gasping for air.    Problems with coping with sadness, anxiety or depression. If you have concerns about hurting yourself or the baby, call your provider immediately.      You have questions or concerns after you return home.            Pending Results     No orders found from 10/30/2017 to 2017.            Statement of Approval     Ordered          17 1118  I have reviewed and agree with all the recommendations and orders detailed in this document.  EFFECTIVE NOW     Approved and electronically signed by:  Sriram Garcia MD             Admission Information     Date & Time Provider Department Dept. Phone    2017 Jayla Sunshine MD [facility] [phone]      Your Vitals Were     Blood Pressure Pulse Temperature Respirations Height Pulse Oximetry    138/86 100 98.5  F (36.9  C) (Oral) 18 1.753 m (5' 9\") 98%      MyChart Information     [employer] lets you send messages to your doctor, view your test results, renew your prescriptions, schedule appointments and more. To sign up, go to [email or web address] . Click on \"Log in\" on the left side of the screen, which will take you to the Welcome page. Then click on \"Sign up Now\" on the right side of the page.     You will be asked to enter the access code listed below, as well as some personal information. Please follow the directions to create your username and password.     Your access code is: [email or web address]  Expires: 2017  2:16 AM     Your access code will  in 90 days. If you need help or a new code, please call your " [facility] or [phone].        Care EveryWhere ID     This is your Care EveryWhere ID. This could be used by other organizations to access your [facility] medical records  [id number]        Equal Access to Services     DAINA CONCEPCION : Hadii aad ku hadaurynadia Rubiokari, watenzinda luqadaha, qaybta kaalmada adesrikanth, ruddy melyin hayaaanais connormiguehank he. So [facility] [phone].    ATENCIÓN: Si habla español, tiene a hawley disposición servicios gratuitos de asistencia lingüística. Llame al [phone].    We comply with applicable federal civil rights laws and [place] laws. We do not discriminate on the basis of race, color, national origin, age, disability, sex, sexual orientation, or gender identity.               Review of your medicines      START taking        Dose / Directions    acetaminophen 325 MG tablet   Commonly known as:  TYLENOL   Used for:  S/P  section        Dose:  650 mg   Take 2 tablets (650 mg) by mouth every 4 hours as needed for other (surgical pain)   Quantity:  100 tablet   Refills:  0       ibuprofen 400 MG tablet   Commonly known as:  ADVIL/MOTRIN   Used for:  S/P  section   Notes to Patient:  Take with food        Dose:  400 mg   Take 1 tablet (400 mg) by mouth every 4 hours as needed for other (cramping)   Quantity:  120 tablet   Refills:  0       * methylPREDNISolone 4 MG tablet   Commonly known as:  MEDROL   Used for:  Hives        Dose:  4 mg   Take 1 tablet (4 mg) by mouth every morning (before breakfast)   Quantity:  4 tablet   Refills:  0       * methylPREDNISolone 4 MG tablet   Commonly known as:  MEDROL   Used for:  Hives        Dose:  4 mg   Take 1 tablet (4 mg) by mouth daily (with lunch)   Quantity:  2 tablet   Refills:  0       * methylPREDNISolone 4 MG tablet   Commonly known as:  MEDROL   Used for:  Hives        Dose:  4 mg   Take 1 tablet (4 mg) by mouth daily (with dinner)   Quantity:  1 tablet   Refills:  0       * methylPREDNISolone 4 MG tablet    Commonly known as:  MEDROL   Used for:  Hives        Dose:  4 mg   Start taking on:  2017   Take 1 tablet (4 mg) by mouth At Bedtime   Quantity:  3 tablet   Refills:  0       oxyCODONE IR 5 MG tablet   Commonly known as:  ROXICODONE   Used for:  S/P  section        Dose:  5-10 mg   Take 1-2 tablets (5-10 mg) by mouth every 4 hours as needed for moderate to severe pain   Quantity:  18 tablet   Refills:  0       senna-docusate 8.6-50 MG per tablet   Commonly known as:  SENOKOT-S;PERICOLACE   Used for:  S/P  section        Dose:  1-2 tablet   Take 1-2 tablets by mouth 2 times daily   Quantity:  100 tablet   Refills:  0       * Notice:  This list has 4 medication(s) that are the same as other medications prescribed for you. Read the directions carefully, and ask your doctor or other care provider to review them with you.      CONTINUE these medicines which have NOT CHANGED        Dose / Directions    ALLEGRA PO        Dose:  180 mg   Take 180 mg by mouth   Refills:  0       BENADRYL PO        Dose:  50 mg   Take 50 mg by mouth every 6 hours as needed for allergies   Refills:  0       predniSONE 10 MG tablet   Commonly known as:  DELTASONE        15 day taper  40mg days #1, 2, 3 35mg days #4, 5, 6 30mg days #7, 8, 9 25mg days #10,11, 12 20mg days #13, 14, 15   Quantity:  32 tablet   Refills:  0       prenatal multivitamin plus iron 27-0.8 MG Tabs per tablet        Dose:  1 tablet   Take 1 tablet by mouth daily   Refills:  0       RANITIDINE HCL PO        Dose:  75 mg   Take 75 mg by mouth daily   Refills:  0       VISTARIL PO        Dose:  100 mg   Take 100 mg by mouth every 4 hours as needed for itching   Refills:  0            Where to get your medicines      These medications were sent to [place], [place] - [id number] [facility] [address]     Phone:  [phone]     acetaminophen 325 MG tablet    ibuprofen 400 MG tablet     methylPREDNISolone 4 MG tablet    methylPREDNISolone 4 MG tablet    methylPREDNISolone 4 MG tablet    methylPREDNISolone 4 MG tablet    senna-docusate 8.6-50 MG per tablet         Some of these will need a paper prescription and others can be bought over the counter. Ask your nurse if you have questions.     Bring a paper prescription for each of these medications     oxyCODONE IR 5 MG tablet                Protect others around you: Learn how to safely use, store and throw away your medicines at www.disposemymeds.org.             Medication List: This is a list of all your medications and when to take them. Check marks below indicate your daily home schedule. Keep this list as a reference.      Medications           Morning Afternoon Evening Bedtime As Needed    acetaminophen 325 MG tablet   Commonly known as:  TYLENOL   Take 2 tablets (650 mg) by mouth every 4 hours as needed for other (surgical pain)   Last time this was given:  975 mg on 11/4/2017 12:30 PM                                   ALLEGRA PO   Take 180 mg by mouth                                   BENADRYL PO   Take 50 mg by mouth every 6 hours as needed for allergies                                   ibuprofen 400 MG tablet   Commonly known as:  ADVIL/MOTRIN   Take 1 tablet (400 mg) by mouth every 4 hours as needed for other (cramping)   Last time this was given:  800 mg on 11/4/2017  9:12 AM   Notes to Patient:  Take with food                                   * methylPREDNISolone 4 MG tablet   Commonly known as:  MEDROL   Take 1 tablet (4 mg) by mouth every morning (before breakfast)   Last time this was given:  4 mg on 11/4/2017 12:30 PM                                   * methylPREDNISolone 4 MG tablet   Commonly known as:  MEDROL   Take 1 tablet (4 mg) by mouth daily (with lunch)   Last time this was given:  4 mg on 11/4/2017 12:30 PM                                   * methylPREDNISolone 4 MG tablet   Commonly known as:  MEDROL   Take 1 tablet (4  mg) by mouth daily (with dinner)   Last time this was given:  4 mg on 11/4/2017 12:30 PM                                   * methylPREDNISolone 4 MG tablet   Commonly known as:  MEDROL   Take 1 tablet (4 mg) by mouth At Bedtime   Start taking on:  11/5/2017   Last time this was given:  4 mg on 11/4/2017 12:30 PM                                   oxyCODONE IR 5 MG tablet   Commonly known as:  ROXICODONE   Take 1-2 tablets (5-10 mg) by mouth every 4 hours as needed for moderate to severe pain   Last time this was given:  5 mg on 11/3/2017  9:14 PM                                   predniSONE 10 MG tablet   Commonly known as:  DELTASONE   15 day taper  40mg days #1, 2, 3 35mg days #4, 5, 6 30mg days #7, 8, 9 25mg days #10,11, 12 20mg days #13, 14, 15   Last time this was given:  5 mg on 11/1/2017  6:46 PM                                prenatal multivitamin plus iron 27-0.8 MG Tabs per tablet   Take 1 tablet by mouth daily                                   RANITIDINE HCL PO   Take 75 mg by mouth daily                                   senna-docusate 8.6-50 MG per tablet   Commonly known as:  SENOKOT-S;PERICOLACE   Take 1-2 tablets by mouth 2 times daily   Last time this was given:  1 tablet on 11/4/2017  9:11 AM                                      VISTARIL PO   Take 100 mg by mouth every 4 hours as needed for itching                                   * Notice:  This list has 4 medication(s) that are the same as other medications prescribed for you. Read the directions carefully, and ask your doctor or other care provider to review them with you.

## 2017-11-01 NOTE — PLAN OF CARE
Patient arrived to L and D unit at 0745 for elective induction.  Denies leakage of fluid, vaginal bleeding, headache, epigastric pain, visual disturbances.  Fetal movement present.  External monitors applied.  Physical assessment and health history taken.  Admission questions answered and bill of rights reviewed.  PLAN:  Orders for intrapartum and induction from Dr. Sunshine.

## 2017-11-01 NOTE — PROVIDER NOTIFICATION
11/01/17 1340   Provider Notification   Provider Name/Title Dr. Sunshine   Method of Notification Electronic Page   Request Evaluate in Person   Notification Reason Status Update   MD updated on FHT's with LD and PD and labor interventions of FSE placed, pitocin d/c, O2 in place, and IV fluid bolus.  With FSE working intermittently with Jennifer, RN attempting to place FSE as well. MD on the way.

## 2017-11-01 NOTE — PROVIDER NOTIFICATION
11/01/17 1817   Provider Notification   Provider Name/Title Dr. Sunshine   Method of Notification Phone   Request Evaluate - Remote   Notification Reason Status Update   MD updated on patients hives getting worse.  Orders received for 5 mg of prednisone oral, one time dose and 25 mg of IV benadryl prn.

## 2017-11-01 NOTE — PROGRESS NOTES
"LABOR NOTE    Subjective: Comfortable.    /58  Pulse 99  Temp 97.9  F (36.6  C) (Oral)  Resp 18  Ht 1.753 m (5' 9\")  SpO2 97%   FHT: 130, mod rona, recurrent late decelerations, accel  Mertzon: q2-4 min  SVE: 4/80/-3    A/P:  Recurrent late decelerations with resolution when pitocin turned back off. Pitocin was at 4 mu/min prior to stopping. Patient has had multiple episodes of recurrent late decelerations when pitocin ongoing, though improves once pitocin is off. Patient is concerned of fetal well being and desires  section. Plan for this and consent form signed.   "

## 2017-11-01 NOTE — ANESTHESIA PREPROCEDURE EVALUATION
PAC NOTE:       ANESTHESIA PRE EVALUATION:  Anesthesia Evaluation     .             ROS/MED HX    ENT/Pulmonary:     (+)tobacco use, , . .    Neurologic: Comment: Myasthenia Gravis - neg neurologic ROS     Cardiovascular:  - neg cardiovascular ROS       METS/Exercise Tolerance:     Hematologic:  - neg hematologic  ROS       Musculoskeletal:  - neg musculoskeletal ROS       GI/Hepatic:  - neg GI/hepatic ROS       Renal/Genitourinary:  - ROS Renal section negative       Endo:  - neg endo ROS       Psychiatric:  - neg psychiatric ROS       Infectious Disease:  - neg infectious disease ROS       Malignancy:         Other:                     Physical Exam  Normal systems: cardiovascular and pulmonary    Airway   Mallampati: II    Dental     Cardiovascular   Rhythm and rate: regular and normal      Pulmonary    breath sounds clear to auscultation             Anesthesia Plan      History & Physical Review  History and physical reviewed and following examination; no interval change.    ASA Status:  2 .        Plan for Epidural   PONV prophylaxis:  Ondansetron (or other 5HT-3) and Scopolamine patch  Labor epidural in place      Postoperative Care  Postoperative pain management:  IV analgesics, Oral pain medications, Multi-modal analgesia and Neuraxial analgesia.      Consents                            .

## 2017-11-01 NOTE — OP NOTE
[facility]   Operative Note     Date of Procedure: 17    Preoperative diagnosis: 40w3d gestation, recurrent late decelerations, AMA, drug screen positive in pregnancy, tobacco use in pregnancy, myasthenia gravis, obesity    Post operative diagnosis: same    Procedure: primary low transverse  section with double layer closure of uterine incision    Surgeon: Jayla Sunshine MD  Assistant surgeon: none    Anesthesia: Epidural     Indication: Maddi is a 42 year old  who presented at 40w3d gestation who presented for elective induction of labor. Recurrent late decelerations at 4 cm with pitocin turned off. With repeated attempts at restarting pitocin, recurrent late decelerations returned. Decision for  delivery.     EBL: 700 mL    Specimens: fetal cord blood    Findings: viable, cephalic, male infant. 7 lb, 9 oz. Agars of 9 at 1 minute and 9 at 5 minutes. Normal uterus tubes and ovaries.     Procedure: Patient was taken to the operating room. Anesthesia was dosed and found to be adequate. Patient was prepared and draped in the normal sterile fashion in the supine position with a leftward tilt. Surgical time out was performed. Anesthesia relief was confirmed. Bill Marks skin incision was made with the scalpel and carried through to the underlying layer of fascia with the Bovie. Fascia was incised in the midline and this incision was extended laterally with caudal-cephalad traction. Rectus muscles were seperated in the midline with blunt lateral force. Peritoneum was entered bluntly with the digit. This peritoneal incision was extended with lateral traction. Bladder blade was inserted and vesicouterine peritoneum was inspected. It was entered sharply with the Metzenbaum scissors and extended laterally with the scissors. Bladder flap was created digitally. Bladder blade was reinserted. Lower uterine segment was incised in a transverse fashion with the scalpel. This incision  was continued to the uterine cavity and the uterine cavity was entered with the digit. Uterine incision was extended with caudal-cephalad traction. Viable infant was delivered atraumatically from a cephalic position. Nose and mouth were suctioned with bulb suctioning. Cord was clamped and cut. Infant was handed off the the waiting nursing staff. Placenta was delivered spontaneously and discarded. Uterus was exteriorized and wiped clean of all clot and debris with a moist sponge. Uterine incision was closed with 0-Vicryl in a running fashion. An imbricating second layer was created with 0-Vicryl. Inspection of the uterine incision demonstrated hemostasis. The uterus was returned to the abdomen. The bladder blade was reinserted and prolonged inpsection of the uterine incision again demonstrated hemostasis. Gutters were wiped clean of all clot and debris. The rectus muscles were inspected and noted to be hemostatic with use of electrocautery. The fascia was re-approximated with 0-Vicryl in a running fashion. Subcutaneous space was inspected and hemostasis was achieved with electrocautery. Subcutaneous space was re approximated with 2-0 Vicryl. Skin was closed with staples. Sponge, lap and needle counts were correct times two as counted and reported by the nursing staff.

## 2017-11-01 NOTE — ANESTHESIA PROCEDURE NOTES
Peripheral nerve/Neuraxial procedure note : epidural catheter  Pre-Procedure  Performed by ZBIGNIEW ALDRICH  Referred by KATELYN CM  Location: OB      Pre-Anesthestic Checklist: patient identified, IV checked, risks and benefits discussed, informed consent, monitors and equipment checked, pre-op evaluation and at physician/surgeon's request    Timeout  Correct Patient: Yes   Correct Procedure: Yes   Correct Site: Yes   Correct Laterality: N/A   Correct Position: Yes   Site Marked: N/A   .   Procedure Documentation    .    Procedure:    Epidural catheter.     .  .       Assessment/Narrative  .  .  . Comments:  Pre-Procedure  Performed by Zbigniew Aldrich MD  Location: OB.      PreAnesthestic Checklist: patient identified, IV checked, risks and benefits discussed, informed consent obtained, monitors and equipment checked, pre-op evaluation and at physician/surgeon's request.    Timeout   Correct Patient: Yes  Correct Procedure: Epidural catheter placement  Correct Site: Yes   Correct Position: Yes    Procedure Documentation  Procedure:   Epidural catheter block for Labor    Patient currently in labor and she and OBMD request a labor epidural to control her labor pains. Patient was interviewed and examined. Procedure and risks including but not limited to bleeding, infection, nerve injury, paralysis, PDPH, and inadequate block requiring intervention discussed with patient. Questions answered. This epidural is to be placed in anticipation of vaginal delivery.  She consents to the epidural procedure.  Time-out was performed.  I or my partners remain immediately available for management of any issues or complications and will monitor at appropriate intervals.  Procedure: Patient sitting. Betadine prep x 3. Sterile drape applied.  Mask and gloves used.  Lidocaine 1%  local infiltration at L 3-4.  17 G. Tuohy needle at L3-4 by loss of resistance into epidural space.  No CSF, paresthesia or blood. 1.5 % Lidocaine with 1:200,000  Epinephrine 5cc test dose. Epidural catheter inserted w/o resistance to 5 cm in epidural space. Then 0.125% bupivicaine 10 cc with NS 5 cc.  Aspiration negative for blood and CSF.   Negative for neuro change, paresthesia or symptoms of intravascular injection or intrathecal injection.  Infusion orders written and infusion of 0.125% bupivicaine 15cc per hour started.    Zbigniew Aldrich MD

## 2017-11-01 NOTE — H&P
[facility]     History and Physical  Obstetrics and Gynecology     Date of Admission:  2017    History of Present Illness   Maddi Muller is a 42 year old female  40w3d with Estimated Date of Delivery: Oct 29, 2017 calculated from 15 wk ultrasound. [facility].  Indication: elective.     PRENATAL COURSE  Prenatal course was complicated by AMA, unplanned pregnancy, +amphetamins on drug screen early in pregnancy, ongoing tobacco use in pregnancy, PCOS, chronic uticaria (prednisone off and on), history of cervical dysplasia, myasthenia gravis, depression, bilateral fetal pylectasis, history of GDM prior pregnancy  Pelvis proven to 8.5#    Recent Labs   Lab Test  17   0845   ABO  O   RH  Pos     Rhogam not indicated   Rubella: immune    Past Medical History    I have reviewed this patient's medical history and updated it with pertinent information if needed.   Past Medical History:   Diagnosis Date     Cervical dysplasia      Diabetes (H)     GDM with previous pregnancy     Myasthenia gravis (H)      PCOS (polycystic ovarian syndrome)        Past Surgical History   I have reviewed this patient's surgical history and updated it with pertinent information if needed.  Past Surgical History:   Procedure Laterality Date     COLPOSCOPY CERVIX, BIOPSY CERVIX, ENDOCERVICAL CURETTAGE, COMBINED         Prior to Admission Medications   Prior to Admission Medications   Prescriptions Last Dose Informant Patient Reported? Taking?   DiphenhydrAMINE HCl (BENADRYL PO) Past Week at Unknown time  Yes Yes   Sig: Take 50 mg by mouth every 6 hours as needed for allergies   Fexofenadine HCl (ALLEGRA PO) 2017 at Unknown time  Yes Yes   Sig: Take 180 mg by mouth    HydrOXYzine Pamoate (VISTARIL PO)   Yes No   Sig: Take 100 mg by mouth every 4 hours as needed for itching   Prenatal Vit-Fe Fumarate-FA (PRENATAL MULTIVITAMIN PLUS IRON) 27-0.8  MG TABS per tablet Past Month at Unknown time  Yes Yes   Sig: Take 1 tablet by mouth daily   RANITIDINE HCL PO   Yes No   Sig: Take 75 mg by mouth daily    predniSONE (DELTASONE) 10 MG tablet Past Week at Unknown time  No Yes   Sig: 15 day taper   40mg days #1, 2, 3  35mg days #4, 5, 6  30mg days #7, 8, 9  25mg days #10,11, 12  20mg days #13, 14, 15      Facility-Administered Medications: None     Allergies   No Known Allergies    Social History   I have reviewed this patient's social history and updated it with pertinent information if needed. Maddi Muller  reports that she has been smoking.  She has been smoking about 0.50 packs per day. She has never used smokeless tobacco. She reports that she does not drink alcohol or use illicit drugs.    Family History   I have reviewed this patient's family history and updated it with pertinent information if needed.   No family history on file.    Immunization History   Immunization status is unknown    ROS: no fever. Feels congested. Has hives due to anxiety/nervous. No cough.     Physical Exam   Temp: 98.3  F (36.8  C) Temp src: Oral BP: 137/76     Resp: 18        Vital Signs with Ranges  Temp:  [98.3  F (36.8  C)] 98.3  F (36.8  C)  Resp:  [18] 18  BP: (137)/(76) 137/76  Fetal Heart Tones: 135 baseline, moderate variablility, + accels and no decels  TOCO: rare contraction    Constitutional: healthy, alert, active and no distress   Respiratory: no increased work of breathing  Cardiovascular: regular rate and rhythm  Abdomen: gravid, single vertex fetus, non-tender, EFW 8 lbs   Cervical Exam: 1.5/ 50/ Posterior/ soft/ -3   AROM: clear  Skin/Extremites: with some hives     U/S 10/3/17 (36w 2d):  Cephalic. EFW 59% (6lb 8.6oz), AC 71%     Persistent moderate pylectasis of both kidneys. Right renal pelvis 9 mm and left 11 mm. Bladder and ureters appeared normal.    Assessment & Plan   Maddi Muller is a 42 year old female   who presents at 40w3d for elective induction. GBS negative.   NST reactive.   Admit - see IP orders  Labor induction with Pitocin and AROM  Fetal pylectasis: postpartum peds eval  Myasthenia gravis  Amphetamines on urine drug screen: drug screen today  AMA  Obesity    Jayla Sunshine MD

## 2017-11-01 NOTE — PROGRESS NOTES
"LABOR NOTE    Subjective: Called by RN due to recurrent late decelerations. Received an epidural a bit ago with no change in blood pressure. Patient comfortable. Pitocin off. Maternal oxygen on.    Objective:  /65  Pulse 99  Temp 98.2  F (36.8  C) (Oral)  Resp 18  Ht 1.753 m (5' 9\")  SpO2 96%   FHT: 160, mod rona  [place]: q 4 min  SVE: 4/80/-3     FSE replaced    Assessment and Plan:  FHT category 1 currently, though recent recurrent late decelerations. New baseline also noted.  FSE in place.   Restart pitocin once reactive NST or in 30 min so long as no variable or late decelerations.    "

## 2017-11-01 NOTE — PROVIDER NOTIFICATION
11/01/17 1025   Provider Notification   Provider Name/Title Dr. Beckett   Method of Notification Electronic Page   Request Evaluate - Remote   Notification Reason Status Update   Epidural orders received per patient.

## 2017-11-02 LAB — HGB BLD-MCNC: 10.8 G/DL (ref 11.7–15.7)

## 2017-11-02 PROCEDURE — 85018 HEMOGLOBIN: CPT | Performed by: OBSTETRICS & GYNECOLOGY

## 2017-11-02 PROCEDURE — 12000029 ZZH R&B OB INTERMEDIATE

## 2017-11-02 PROCEDURE — 36415 COLL VENOUS BLD VENIPUNCTURE: CPT | Performed by: OBSTETRICS & GYNECOLOGY

## 2017-11-02 PROCEDURE — 25000132 ZZH RX MED GY IP 250 OP 250 PS 637: Performed by: OBSTETRICS & GYNECOLOGY

## 2017-11-02 PROCEDURE — 25000128 H RX IP 250 OP 636: Performed by: OBSTETRICS & GYNECOLOGY

## 2017-11-02 RX ORDER — FEXOFENADINE HCL AND PSEUDOEPHEDRINE HCI 60; 120 MG/1; MG/1
1 TABLET, EXTENDED RELEASE ORAL EVERY 12 HOURS SCHEDULED
Status: DISCONTINUED | OUTPATIENT
Start: 2017-11-02 | End: 2017-11-04 | Stop reason: HOSPADM

## 2017-11-02 RX ORDER — FEXOFENADINE HCL AND PSEUDOEPHEDRINE HCL 180; 240 MG/1; MG/1
1 TABLET, EXTENDED RELEASE ORAL DAILY
Status: DISCONTINUED | OUTPATIENT
Start: 2017-11-02 | End: 2017-11-02

## 2017-11-02 RX ADMIN — ACETAMINOPHEN 975 MG: 325 TABLET, FILM COATED ORAL at 11:13

## 2017-11-02 RX ADMIN — OXYCODONE HYDROCHLORIDE 5 MG: 5 TABLET ORAL at 23:09

## 2017-11-02 RX ADMIN — FEXOFENADINE HYDROCHLORIDE AND PSEUDOEPHEDRINE HYDROCHLORIDE 1 TABLET: 60; 120 TABLET, FILM COATED, EXTENDED RELEASE ORAL at 19:47

## 2017-11-02 RX ADMIN — SENNOSIDES AND DOCUSATE SODIUM 1 TABLET: 8.6; 5 TABLET ORAL at 19:47

## 2017-11-02 RX ADMIN — IBUPROFEN 800 MG: 400 TABLET ORAL at 15:02

## 2017-11-02 RX ADMIN — ACETAMINOPHEN 975 MG: 325 TABLET, FILM COATED ORAL at 03:33

## 2017-11-02 RX ADMIN — KETOROLAC TROMETHAMINE 15 MG: 30 INJECTION, SOLUTION INTRAMUSCULAR at 05:42

## 2017-11-02 RX ADMIN — IBUPROFEN 800 MG: 400 TABLET ORAL at 23:09

## 2017-11-02 RX ADMIN — FEXOFENADINE HYDROCHLORIDE AND PSEUDOEPHEDRINE HYDROCHLORIDE 1 TABLET: 60; 120 TABLET, FILM COATED, EXTENDED RELEASE ORAL at 09:53

## 2017-11-02 RX ADMIN — SENNOSIDES AND DOCUSATE SODIUM 1 TABLET: 8.6; 5 TABLET ORAL at 09:09

## 2017-11-02 RX ADMIN — NICOTINE 1 PATCH: 14 PATCH, EXTENDED RELEASE TRANSDERMAL at 09:09

## 2017-11-02 RX ADMIN — ACETAMINOPHEN 975 MG: 325 TABLET, FILM COATED ORAL at 19:05

## 2017-11-02 NOTE — PROVIDER NOTIFICATION
11/01/17 1920   Provider Notification   Provider Name/Title Dr. Sunshine   Method of Notification In Department   Request Evaluate-Remote   Notification Reason Medication Request   Dr. Sunshine updated that pt requesting nicotine patch, currently smokes 0.5-1 pack per day.  Nicotine patch ordered by Dr. Sunshine, will provide to pt shortly.

## 2017-11-02 NOTE — PROGRESS NOTES
[facility] Obstetrics Post-Op / Progress Note    Interval History   Doing well.  Pain is well-controlled.  No fevers.  No history of wound drainage, warmth or significant erythema.  Good appetite.  Denies chest pain, shortness of breath, nausea or vomiting.  Ambulatory.  She still has her phelps in place.  She states the prednisone helped but the benadryl did not help.  She has tried allegra in the past and wants to try it again.  She is not breast feeding.      Medications     oxytocin in 0.9% NaCl 100 mL/hr (11/01/17 1928)     dextrose 5% lactated ringers Stopped (11/02/17 0756)     oxytocin in 0.9% NaCl       NO Rho (D) immune globulin (RhoGam) needed - mother Rh POSITIVE         fexofenadine-pseudoePHEDrine  1 tablet Oral Daily     sodium chloride (PF)  3 mL Intracatheter Q8H     senna-docusate  1-2 tablet Oral BID     measles, mumps and rubella vaccine  0.5 mL Subcutaneous Once     Tdap (tetanus-diphtheria-acell pertussis)  0.5 mL Intramuscular Once     acetaminophen  975 mg Oral Q8H     ketorolac  15 mg Intravenous Q6H     nicotine   Transdermal Q8H     nicotine   Transdermal Daily     nicotine  1 patch Transdermal Daily       Physical Exam   Temp: 98.8  F (37.1  C) Temp src: Oral BP: 132/79 Pulse: 101   Resp: 18 SpO2: 99 % O2 Device: None (Room air)    There were no vitals filed for this visit.  Vital Signs with Ranges  Temp:  [97.9  F (36.6  C)-99  F (37.2  C)] 98.8  F (37.1  C)  Pulse:  [] 101  Resp:  [18] 18  BP: ()/(50-89) 132/79  SpO2:  [91 %-99 %] 99 %  I/O last 3 completed shifts:  In: 1655 [I.V.:1655]  Out: 5475 [Urine:4775; Blood:700]    Uterine fundus is firm, non-tender and at the level of the umbilicus  Incision is covered with bandage     Data   Recent Labs   Lab Test  11/01/17   0845   ABO  O  O   RH  Pos  Pos   AS  Neg     Recent Labs   Lab Test  11/02/17   0655  11/01/17   0845   HGB  10.8*  11.5*     No lab results found.    Assessment & Plan   -1 Day Post-Op  Procedure(s):   - Wound Class: II-Clean Contaminated    Doing well.  No excessive bleeding  Pain well-controlled.  Hives:  Will start allegra D as she is not breast feeding and it has helped in the past.  She will also use benadryl.    She was offered a nicotine patch but declined at this time.   Plan on discharge in 2 days     Lola Lieberman

## 2017-11-02 NOTE — PLAN OF CARE
Writer noticed bottle of Allegra on patients dunne stand, patient states its not hers its the FOB, Rui.

## 2017-11-02 NOTE — PROVIDER NOTIFICATION
11/01/17 2050   Provider Notification   Provider Name/Title Dr. Sunshine   Method of Notification Phone   Request Evaluate-Remote   Notification Reason Medication Request   Dr. Sunshine updated on pt's continued itching, hives noted across pt's back, hips, and abdomen.  VORB for 10mg Nubain IV KEM.  Will update KAZ Nettles.

## 2017-11-02 NOTE — PROGRESS NOTES
Patient continues to have itching and hives this shift. Nubain given with some relief. Patient states hives are same as they were in pregnancy and prednisone helps. Patient tolerating liquid diet. Some tingling still present in legs.Dressing clean and dry. Using Toradol for pain. Education taught to patient and patient verbalized understanding.

## 2017-11-02 NOTE — CONSULTS
Brief SW note:  SW responding to positive U-tox on 5/8/17 confirmation for methamphetamine. SW spoke with Suly at [facility] CPS who reports that MOB was tested again and was negative on 5/18/17. No CPS case was open.  SW to contact CPS if Meconium is positive.

## 2017-11-02 NOTE — PLAN OF CARE
Problem: Patient Care Overview  Goal: Plan of Care/Patient Progress Review  Outcome: Improving  Patient doing well this shift. OOB independently. Ambulating in room. Tolerating regular diet. Denies passing flatus yet. Carmona removed this morning, and she has voided x1 in large amount. Saline lock not patent for last toradol dose. Removed and offered ibuprofen. Patient declined at that time. Put nicotine patch on left arm but patient decided to go out to smoke about 45 minutes later so it was removed. Patient has been out to smoke 2 times with assistance from SO in w/c. Continues to have itching from hives, but Allegra given this morning for some relief. Patient does not complain of itching nor seen itching at skin unless asked about skin. She only complains of facial itchiness, but was noted to have spots of redness on abdomen as well.

## 2017-11-02 NOTE — PLAN OF CARE
Problem: Patient Care Overview  Goal: Plan of Care/Patient Progress Review  Outcome: Improving  VSS, Bonding well with baby. Pain is well controlled with tylenol and toradol. Carmona cares completed. Continues to have hives and itching this shift. Stated benadryl did not help. Ambulated to bathroom with a stand by assist.Tolerating regular diet well. Independent in self and baby cares. Formula feeding is going well. Meeting expected outcomes.

## 2017-11-02 NOTE — ANESTHESIA POSTPROCEDURE EVALUATION
Patient: Maddi Muller    * No procedures listed *    Diagnosis:* No pre-op diagnosis entered *  Diagnosis Additional Information: Labor pain    Anesthesia Type:  No value filed.    Note:  Anesthesia Post Evaluation         Comments:     S/P epidural for labor.   I or my partner was immediately available for management of this patient during epidural analgesia infusion.  VSS.  Doing well. Block resolved.  Neuro at baseline. Denies positional headache. Minimal side effects easily managed w/ PRN meds. No apparent anesthetic complications. No follow-up required.    Bill Gaona MD        Last vitals:  Vitals:    11/01/17 2335 11/02/17 0032 11/02/17 0435   BP: 148/85 142/89 132/79   Pulse: 99 103 101   Resp: 18 18 18   Temp: 98.4  F (36.9  C) 98.3  F (36.8  C) 98.8  F (37.1  C)   SpO2: 95% 97% 99%         Electronically Signed By: Bill Gaona MD  November 2, 2017  7:20 AM

## 2017-11-02 NOTE — PLAN OF CARE
Data: Maddi Muller transferred to 442 via cart at 2020. Baby transferred via parent's arms.  Action: Receiving unit notified of transfer: Yes. Patient and family notified of room change. Report given to KAZ Nettles at 2035. Belongings sent to receiving unit. Accompanied by Registered Nurse. Oriented patient to surroundings. Call light within reach. ID bands double-checked with receiving RN.  Response: Patient tolerated transfer and is stable.

## 2017-11-02 NOTE — PROGRESS NOTES
Patient with chronic urticaria. Has been seen by allergist in the past and Rx prednisone 5-10 mg prn. She says she would take up to 40 mg at a time. Most recently was last week.   Explained this is not safe to intermittently take this high of a dose.   Prednisone 5 mg once prescribed for generalized itching as well as benadryl IV and nubain IV. If persistent and patient still desiring steroids, we may need a consultation given her past high doses of steroids. Would also be cautious in the past operative period due to wound healing.

## 2017-11-03 PROCEDURE — 25000131 ZZH RX MED GY IP 250 OP 636 PS 637: Performed by: OBSTETRICS & GYNECOLOGY

## 2017-11-03 PROCEDURE — 25000132 ZZH RX MED GY IP 250 OP 250 PS 637: Performed by: OBSTETRICS & GYNECOLOGY

## 2017-11-03 PROCEDURE — 12000029 ZZH R&B OB INTERMEDIATE

## 2017-11-03 RX ORDER — METHYLPREDNISOLONE 4 MG/1
8 TABLET ORAL ONCE
Status: COMPLETED | OUTPATIENT
Start: 2017-11-03 | End: 2017-11-03

## 2017-11-03 RX ORDER — METHYLPREDNISOLONE 4 MG/1
4 TABLET ORAL
Status: DISCONTINUED | OUTPATIENT
Start: 2017-11-04 | End: 2017-11-04 | Stop reason: HOSPADM

## 2017-11-03 RX ORDER — METHYLPREDNISOLONE 4 MG/1
4 TABLET ORAL ONCE
Status: COMPLETED | OUTPATIENT
Start: 2017-11-03 | End: 2017-11-03

## 2017-11-03 RX ORDER — METHYLPREDNISOLONE 4 MG/1
4 TABLET ORAL AT BEDTIME
Status: DISCONTINUED | OUTPATIENT
Start: 2017-11-05 | End: 2017-11-04 | Stop reason: HOSPADM

## 2017-11-03 RX ORDER — METHYLPREDNISOLONE 4 MG/1
8 TABLET ORAL AT BEDTIME
Status: DISCONTINUED | OUTPATIENT
Start: 2017-11-03 | End: 2017-11-04 | Stop reason: HOSPADM

## 2017-11-03 RX ADMIN — SENNOSIDES AND DOCUSATE SODIUM 2 TABLET: 8.6; 5 TABLET ORAL at 08:15

## 2017-11-03 RX ADMIN — OXYCODONE HYDROCHLORIDE 5 MG: 5 TABLET ORAL at 21:14

## 2017-11-03 RX ADMIN — OXYCODONE HYDROCHLORIDE 5 MG: 5 TABLET ORAL at 08:15

## 2017-11-03 RX ADMIN — IBUPROFEN 800 MG: 400 TABLET ORAL at 11:15

## 2017-11-03 RX ADMIN — ACETAMINOPHEN 975 MG: 325 TABLET, FILM COATED ORAL at 11:15

## 2017-11-03 RX ADMIN — METHYLPREDNISOLONE 8 MG: 4 TABLET ORAL at 21:56

## 2017-11-03 RX ADMIN — FEXOFENADINE HYDROCHLORIDE AND PSEUDOEPHEDRINE HYDROCHLORIDE 1 TABLET: 60; 120 TABLET, FILM COATED, EXTENDED RELEASE ORAL at 08:15

## 2017-11-03 RX ADMIN — METHYLPREDNISOLONE 4 MG: 4 TABLET ORAL at 11:21

## 2017-11-03 RX ADMIN — FEXOFENADINE HYDROCHLORIDE AND PSEUDOEPHEDRINE HYDROCHLORIDE 1 TABLET: 60; 120 TABLET, FILM COATED, EXTENDED RELEASE ORAL at 19:33

## 2017-11-03 RX ADMIN — ACETAMINOPHEN 975 MG: 325 TABLET, FILM COATED ORAL at 02:58

## 2017-11-03 RX ADMIN — METHYLPREDNISOLONE 4 MG: 4 TABLET ORAL at 11:22

## 2017-11-03 RX ADMIN — OXYCODONE HYDROCHLORIDE 5 MG: 5 TABLET ORAL at 17:42

## 2017-11-03 RX ADMIN — METHYLPREDNISOLONE 8 MG: 4 TABLET ORAL at 11:22

## 2017-11-03 RX ADMIN — IBUPROFEN 800 MG: 400 TABLET ORAL at 05:17

## 2017-11-03 RX ADMIN — ACETAMINOPHEN 975 MG: 325 TABLET, FILM COATED ORAL at 19:33

## 2017-11-03 RX ADMIN — IBUPROFEN 800 MG: 400 TABLET ORAL at 17:42

## 2017-11-03 RX ADMIN — SENNOSIDES AND DOCUSATE SODIUM 1 TABLET: 8.6; 5 TABLET ORAL at 19:33

## 2017-11-03 NOTE — PLAN OF CARE
Problem: Patient Care Overview  Goal: Plan of Care/Patient Progress Review  Outcome: Improving  VSS, Bonding well with baby. Pain is well controlled with tylenol, ibuprofen, and oxycodone. No longer using the nicotine patch, she is going out to smoke. Hives on face are reduced in number and redness compared to my assessment on 11/2/2017. Patient is voiding without difficulty and ambulating independently. Tolerating regular diet well. Independent in self and baby cares. Formula feeding is going well. Reminded patient to place infant in bassinet when she is tired for safety reasons. Meeting expected outcomes.

## 2017-11-03 NOTE — CONSULTS
D:  SW responding to MD consult.  Maddi is a 42 yr old SPK, she and FOB, Jacky Siegel, have been living together in [place] along with her 9 yr old son, Jose Rafael from a previous relationship.  Jackson son, Ozzie is the second child for Jacky, he has a 23 yr old dtr.  I: Reviewed chart then met with Maddi.  She states she is prepared for Ozzie at home, received a baby box from her clinic and has the necessary equipment and supplies.  She has been employed part-time at the [employer] but will take time off to recover from the c-s.  She states the relationship with Jacky began when she took in roommates to help pay for rent.  They have currently been together about yr.  She has not enrolled in [place] and intends to do so.  Discussed her past history of chemical dependency, she is are that Ozzie was tested and if the results are postive a report will be made to [facility].  She stated she is sure it will be all negative.  Discussed postpartum depression and anxiety and provided her with a fact sheet to take home to review.  Also provided her with the Resources for Parents booklet with SW contact information.  Jose Rafael is currently staying with his birth-father and they share custody.  Her mother lives in [place] and can be called on for support and assistance if needed.  SW provided education, support and resources.  A: Maddi receptive to SW intervention, was occasionally difficult to keep on the topic being discussed, pleasantly rambled on.  Has limited financial and emotional support.  Present relationship status may be limited. Observed maternal/infant bonding.  P: SW completed consult.

## 2017-11-03 NOTE — PROGRESS NOTES
Patient meeting expected goals this shift. Able to do all self cares and cares for . Voiding without difficulty. Education taught to patient and patient verbalized understanding. Using tylenol for pain. Patient outside 3 times this shift. Bonding well with .

## 2017-11-03 NOTE — PLAN OF CARE
Problem: Patient Care Overview  Goal: Plan of Care/Patient Progress Review  Outcome: Improving  Patient doing well today. Ambulating in hallways independently, out to smoke a few times. Pain well controlled with schedule and PRN medications, only took oxycodone one time this shift. She also showered this afternoon. Itching has been mostly relieved with Allegra, still has some spots on her face and states a swollen left eye that is bothering her. Started on steroid dose pack late this morning per order. Patient does not c/o itching or hives bothering her nor seen to be itching unless staff asks about rash/hives. Bonding very well with . She has been independent with self and  cares.     Took in ROP paperwork and fatherhood booklet in anticipation of d/c tomorrow. FOB not present and was only present shortly this morning, working on his computer with no interaction noted between patient/ and FOB. Patient states that they are fighting and asking about child support. Referred her to the booklet.

## 2017-11-03 NOTE — PROGRESS NOTES
"[facility] Postop C/S Note    S:  Patient without complaints other than typical soreness.  Minimal lochia.  Flatus passed, tolerating Regular diet well.  Is going outside to smoke.  Continues to c/o of hives and swelling of her eyelids.  Was started on Allegra yesterday with minimal relief and was given a dose of 5 mg of Predisone on POD #0.  Pt states that taking 40 mg of Predisone for several days will usually clear this up.  Benadryl not helping too much,    O:  Blood pressure 145/90, pulse 106, temperature 98.7  F (37.1  C), temperature source Oral, resp. rate 18, height 1.753 m (5' 9\"), SpO2 98 %, unknown if currently breastfeeding.          Intake/Output Summary (Last 24 hours) at 11/03/17 0803  Last data filed at 11/02/17 0900   Gross per 24 hour   Intake                0 ml   Output              325 ml   Net             -325 ml           Abdomen - Non-distended, Normoactive bowel sounds, soft, appropriately tender; Fundus firm, at umbilicus, nontender        Incision - clean, dry, intact, bandage removed and incision intact        Extremities - No calf tenderness    Hemoglobin   Date Value Ref Range Status   11/02/2017 10.8 (L) 11.7 - 15.7 g/dL Final   11/01/2017 11.5 (L) 11.7 - 15.7 g/dL Final       A:   Postop Day# 2, s/p Primary LTCS for NRFHT's, tobacco use, urticaria of unknown etiology - doing well    P:  1)  Routine care, Hgb stable        2)  Formula feeding        3)  Will try Medrol dose pack.  Reviewed concern of steroids with healing, compounded by tobacco use.        4)  Anticipate discharge tomorrow.    Angelique Blackburn, DO          "

## 2017-11-04 VITALS
TEMPERATURE: 98.5 F | HEIGHT: 69 IN | DIASTOLIC BLOOD PRESSURE: 86 MMHG | HEART RATE: 100 BPM | OXYGEN SATURATION: 98 % | RESPIRATION RATE: 18 BRPM | SYSTOLIC BLOOD PRESSURE: 138 MMHG

## 2017-11-04 PROCEDURE — 25000132 ZZH RX MED GY IP 250 OP 250 PS 637: Performed by: OBSTETRICS & GYNECOLOGY

## 2017-11-04 PROCEDURE — 25000128 H RX IP 250 OP 636: Performed by: OBSTETRICS & GYNECOLOGY

## 2017-11-04 PROCEDURE — 25000131 ZZH RX MED GY IP 250 OP 636 PS 637: Performed by: OBSTETRICS & GYNECOLOGY

## 2017-11-04 PROCEDURE — 90686 IIV4 VACC NO PRSV 0.5 ML IM: CPT | Performed by: OBSTETRICS & GYNECOLOGY

## 2017-11-04 PROCEDURE — 90732 PPSV23 VACC 2 YRS+ SUBQ/IM: CPT | Performed by: OBSTETRICS & GYNECOLOGY

## 2017-11-04 RX ORDER — METHYLPREDNISOLONE 4 MG/1
4 TABLET ORAL AT BEDTIME
Qty: 3 TABLET | Refills: 0 | Status: SHIPPED | OUTPATIENT
Start: 2017-11-05

## 2017-11-04 RX ORDER — AMOXICILLIN 250 MG
1-2 CAPSULE ORAL 2 TIMES DAILY
Qty: 100 TABLET | Refills: 0 | Status: SHIPPED | OUTPATIENT
Start: 2017-11-04

## 2017-11-04 RX ORDER — ACETAMINOPHEN 325 MG/1
650 TABLET ORAL EVERY 4 HOURS PRN
Qty: 100 TABLET | Refills: 0 | Status: SHIPPED | OUTPATIENT
Start: 2017-11-04

## 2017-11-04 RX ORDER — OXYCODONE HYDROCHLORIDE 5 MG/1
5-10 TABLET ORAL EVERY 4 HOURS PRN
Qty: 18 TABLET | Refills: 0 | Status: SHIPPED | OUTPATIENT
Start: 2017-11-04

## 2017-11-04 RX ORDER — METHYLPREDNISOLONE 4 MG/1
4 TABLET ORAL
Qty: 1 TABLET | Refills: 0 | Status: SHIPPED | OUTPATIENT
Start: 2017-11-04

## 2017-11-04 RX ORDER — METHYLPREDNISOLONE 4 MG/1
4 TABLET ORAL
Qty: 2 TABLET | Refills: 0 | Status: SHIPPED | OUTPATIENT
Start: 2017-11-04

## 2017-11-04 RX ORDER — METHYLPREDNISOLONE 4 MG/1
4 TABLET ORAL
Qty: 4 TABLET | Refills: 0 | Status: SHIPPED | OUTPATIENT
Start: 2017-11-04

## 2017-11-04 RX ORDER — IBUPROFEN 400 MG/1
400 TABLET, FILM COATED ORAL EVERY 4 HOURS PRN
Qty: 120 TABLET | Refills: 0 | Status: SHIPPED | OUTPATIENT
Start: 2017-11-04

## 2017-11-04 RX ADMIN — ACETAMINOPHEN 975 MG: 325 TABLET, FILM COATED ORAL at 12:30

## 2017-11-04 RX ADMIN — INFLUENZA A VIRUS A/MICHIGAN/45/2015 X-275 (H1N1) ANTIGEN (FORMALDEHYDE INACTIVATED), INFLUENZA A VIRUS A/HONG KONG/4801/2014 X-263B (H3N2) ANTIGEN (FORMALDEHYDE INACTIVATED), INFLUENZA B VIRUS B/PHUKET/3073/2013 ANTIGEN (FORMALDEHYDE INACTIVATED), AND INFLUENZA B VIRUS B/BRISBANE/60/2008 ANTIGEN (FORMALDEHYDE INACTIVATED) 0.5 ML: 15; 15; 15; 15 INJECTION, SUSPENSION INTRAMUSCULAR at 12:19

## 2017-11-04 RX ADMIN — METHYLPREDNISOLONE 4 MG: 4 TABLET ORAL at 09:11

## 2017-11-04 RX ADMIN — PNEUMOCOCCAL VACCINE POLYVALENT 0.5 ML
25; 25; 25; 25; 25; 25; 25; 25; 25; 25; 25; 25; 25; 25; 25; 25; 25; 25; 25; 25; 25; 25; 25 INJECTION, SOLUTION INTRAMUSCULAR; SUBCUTANEOUS at 12:20

## 2017-11-04 RX ADMIN — IBUPROFEN 800 MG: 400 TABLET ORAL at 09:12

## 2017-11-04 RX ADMIN — FEXOFENADINE HYDROCHLORIDE AND PSEUDOEPHEDRINE HYDROCHLORIDE 1 TABLET: 60; 120 TABLET, FILM COATED, EXTENDED RELEASE ORAL at 09:12

## 2017-11-04 RX ADMIN — SENNOSIDES AND DOCUSATE SODIUM 1 TABLET: 8.6; 5 TABLET ORAL at 09:11

## 2017-11-04 RX ADMIN — ACETAMINOPHEN 975 MG: 325 TABLET, FILM COATED ORAL at 03:36

## 2017-11-04 RX ADMIN — METHYLPREDNISOLONE 4 MG: 4 TABLET ORAL at 12:30

## 2017-11-04 NOTE — PROGRESS NOTES
[facility]  OB Post-partum progress note    Assessment: 42 year old  POD#3 s/p PLTCS for cat 2 FHT remote from delivery    Plan:  - Hives and eyelid swelling: no relief with antihistamines, started on methylprednisolone pack and improvement, will continue on discharge  - AMA  - Tobacco use  - Positive drug screen earlier in pregnancy  - Myasthenia gravis  - Obesity  - Routine post-op postpartum management: encourage ambulation  - Pain: controlled on oral analgesics  - Heme: Hgb stable  - Disposition: plans discharge home POD#3  - Discussed activity restrictions including: nothing per vagina for 6 weeks (sex, tampons, douching), no lifting more than 20 lbs for 6 weeks, no driving for 3 weeks or while on narcotic pain medications  - Reviewed warning signs to call for including fever greater than 100.4F, severe nausea or vomiting, uncontrolled pain, vaginal bleeding more than 1 pad per hour for two hours, signs of depression, severe persistent headache or visual disturbance, chest pain or dyspnea     Sriram Garcia MD  (pager [phone])  [facility]  2017    Subjective: Maddi Muller feels well this morning. Eyelid swelling and hives improved with steroids. Was able to sleep last night. Pain control adequate on oral meds. Tolerating regular diet. Has been out of bed. Lochia minimal. Voiding spontaneously. Positive flatus and BM. Formula feeding. Mood Good.    Objective:   Vitals:    17 0807 17 1744 17 0336 17 0748   BP: 134/67 154/87 136/84 138/86   Pulse: 92 100     Resp: 20 18 18 18   Temp: 98.2  F (36.8  C) 98.3  F (36.8  C) 98.4  F (36.9  C) 98.5  F (36.9  C)   TempSrc: Oral Oral Oral Oral   SpO2:       Height:                General: healthy, alert and no distress  Abd: soft, appropriately tender near incision, fundus firm  Incision: clean, dry, intact  Legs:  Non-tender, 1+ pitting edema    Hemoglobin   Date Value Ref Range Status   11/02/2017 10.8 (L) 11.7 - 15.7 g/dL Final   11/01/2017 11.5 (L) 11.7 - 15.7 g/dL Final       Lab Results   Component Value Date    RH Pos 11/01/2017    RH Pos 11/01/2017

## 2017-11-04 NOTE — DISCHARGE SUMMARY
[facility]    Discharge Summary  Obstetrics    Date of Admission:  2017  Date of Discharge:  2017  Discharging Provider: Sriram Garcia    Discharge Diagnoses   Patient Active Problem List   Diagnosis     Indication for care in labor or delivery     Indication for care in labor and delivery, delivered   AMA  Obesity  Myasthenia Gravis  Tobacco use  GHTN postpartum  Hives and eyelid swelling    Procedure/Surgery Information   Procedure: Procedure(s):   - Wound Class: II-Clean Contaminated   Surgeon(s): Surgeon(s) and Role:     * Jayla Sunshine MD - Primary     History of Present Illness   Maddi Muller is a 42 year old now  female who presented to L&D for induction of labor. Her pregnancy has been complicated by above diagnoses. Please see her admit H&P for full details of her PMH, PSH, Meds, Allergies and exam on admit.    Hospital course:  The patient delivered via  section @ 40w3d. Please see her operative note for full details regarding the surgery.     Her postoperative course was complicated by hives and swelling for which she was started on corticosteroids with improvement. On post-operative day 3, she was meeting all of her postoperative goals and deemed stable for discharge. She was voiding without difficulty, tolerating a regular diet without nausea and vomiting, her pain was well controlled on oral pain medicines and her lochia was appropriate.    Feeding:formula feeding    Hgb:   Lab Results   Component Value Date    HGB 10.8 2017    HGB 11.5 2017       Lab Results   Component Value Date    RH Pos 2017    RH Pos 2017    and Rhogam was not given    Contraception was discussed and will be addressed further at postpartum appointment.    Instructions:  Maddi Muller was discharged to home in stable condition with the following instructions/medications:  1) Call for temperature greater than  100.4F, foul smelling vaginal discharge, bleeding more than 1 pad per hour for 2 hrs, pain not controlled by oral pain meds, severe constipation or severe nausea or vomiting, incision bleeding or discharge, or separation of wound, or symptoms of depression  2) She was instructed to follow-up with her primary OB in 6 weeks for a routine postpartum visit   3) She was instructed to continue her PNV on discharge if she wished to breast feed her infant.    Sriram Garcia MD    Discharge Disposition   Discharged to home   Condition at discharge: Stable    Primary Care Physician   Pedro Collins    Consultations This Hospital Stay   SOCIAL WORK IP CONSULT  SOCIAL WORK IP CONSULT  ANESTHESIOLOGY IP CONSULT  HOME CARE POST PARTUM/ IP CONSULT  LACTATION IP CONSULT    Discharge Orders     Activity   Review discharge instructions     Reason for your hospital stay   Maternity care     Discharge Instructions - Postpartum visit   Schedule postpartum visit with your provider and return to clinic in 6 weeks.     Diet   Resume previous diet       Discharge Medications   Current Discharge Medication List      START taking these medications    Details   oxyCODONE IR (ROXICODONE) 5 MG tablet Take 1-2 tablets (5-10 mg) by mouth every 4 hours as needed for moderate to severe pain  Qty: 18 tablet, Refills: 0    Associated Diagnoses: S/P  section      acetaminophen (TYLENOL) 325 MG tablet Take 2 tablets (650 mg) by mouth every 4 hours as needed for other (surgical pain)  Qty: 100 tablet, Refills: 0    Associated Diagnoses: S/P  section      ibuprofen (ADVIL/MOTRIN) 400 MG tablet Take 1 tablet (400 mg) by mouth every 4 hours as needed for other (cramping)  Qty: 120 tablet, Refills: 0    Associated Diagnoses: S/P  section      senna-docusate (SENOKOT-S;PERICOLACE) 8.6-50 MG per tablet Take 1-2 tablets by mouth 2 times daily  Qty: 100 tablet, Refills: 0    Associated Diagnoses: S/P  section      !!  methylPREDNISolone (MEDROL) 4 MG tablet Take 1 tablet (4 mg) by mouth every morning (before breakfast)  Qty: 4 tablet, Refills: 0    Associated Diagnoses: Hives      !! methylPREDNISolone (MEDROL) 4 MG tablet Take 1 tablet (4 mg) by mouth daily (with lunch)  Qty: 2 tablet, Refills: 0    Associated Diagnoses: Hives      !! methylPREDNISolone (MEDROL) 4 MG tablet Take 1 tablet (4 mg) by mouth daily (with dinner)  Qty: 1 tablet, Refills: 0    Associated Diagnoses: Hives      !! methylPREDNISolone (MEDROL) 4 MG tablet Take 1 tablet (4 mg) by mouth At Bedtime  Qty: 3 tablet, Refills: 0    Associated Diagnoses: Hives       !! - Potential duplicate medications found. Please discuss with provider.      CONTINUE these medications which have NOT CHANGED    Details   DiphenhydrAMINE HCl (BENADRYL PO) Take 50 mg by mouth every 6 hours as needed for allergies      Prenatal Vit-Fe Fumarate-FA (PRENATAL MULTIVITAMIN PLUS IRON) 27-0.8 MG TABS per tablet Take 1 tablet by mouth daily      Fexofenadine HCl (ALLEGRA PO) Take 180 mg by mouth       predniSONE (DELTASONE) 10 MG tablet 15 day taper   40mg days #1, 2, 3  35mg days #4, 5, 6  30mg days #7, 8, 9  25mg days #10,11, 12  20mg days #13, 14, 15  Qty: 32 tablet, Refills: 0      HydrOXYzine Pamoate (VISTARIL PO) Take 100 mg by mouth every 4 hours as needed for itching      RANITIDINE HCL PO Take 75 mg by mouth daily            Allergies   No Known Allergies

## 2017-11-04 NOTE — PLAN OF CARE
Problem: Patient Care Overview  Goal: Plan of Care/Patient Progress Review  Outcome: Improving  Pt able to get some rest between cares w/  in NBN for part of night.  States ordered pain medications making her more comfortable.  Incision is approximated w/ staples; some ecchymosis noted around area.  Had BM x 1 yesterday; tolerating regular diet.  Denies further itching or hives.  Ambulating frequently.  Independent w/ self and baby cares; no support person present this shift.  Anticipate d/c later today.

## 2017-11-04 NOTE — DISCHARGE INSTRUCTIONS
Postop  Birth Instructions  Please make an appointment to follow up with your primary OB in clinic in 6 weeks.    [facility] Lactation Consultant:  [phone]    Activity       Do not lift more than 10 pounds for 6 weeks after surgery.  Ask family and friends for help when you need it.    No driving until you have stopped taking your pain medications (usually two weeks after surgery).    No heavy exercise or activity for 6 weeks.  Don't do anything that will put a strain on your surgery site.    Don't strain when using the toilet.  Your care team may prescribe a stool softener if you have problems with your bowel movements.     To care for your incision:       Keep the incision clean and dry.    Do not soak your incision in water. No swimming or hot tubs until it has fully healed. You may soak in the bathtub if the water level is below your incision.    Do not use peroxide, gel, cream, lotion, or ointment on your incision.    Adjust your clothes to avoid pressure on your surgery site (check the elastic in your underwear for example).     You may see a small amount of clear or pink drainage and this is normal.  Check with your health care provider:       If the drainage increases or has an odor.    If the incision reddens, you have swelling, or develop a rash.    If you have increased pain and the medicine we prescribed doesn't help.    If you have a fever above 100.4 F (38 C) with or without chills when placing thermometer under your tongue.   The area around your incision (surgery wound), will feel numb.  This is normal. The numbness should go away in less than a year.     Keep your hands clean:  Always wash your hands before touching your incision (surgery wound). This helps reduce your risk of infection. If your hands aren't dirty, you may use an alcohol hand-rub to clean your hands. Keep your nails clean and short.    Call your healthcare provider if you have any of these symptoms:       You soak  a sanitary pad with blood within 1 hour, or you see blood clots larger than a golf ball.    Bleeding that lasts more than 6 weeks.    Vaginal discharge that smells bad.    Severe pain, cramping or tenderness in your lower belly area.    A need to urinate more frequently (use the toilet more often), more urgently (use the toilet very quickly), or it burns when you urinate.    Nausea and vomiting.    Redness, swelling or pain around a vein in your leg.    Problems breastfeeding or a red or painful area on your breast.    Chest pain and cough or are gasping for air.    Problems with coping with sadness, anxiety or depression. If you have concerns about hurting yourself or the baby, call your provider immediately.      You have questions or concerns after you return home.

## 2017-11-04 NOTE — PLAN OF CARE
Problem: Patient Care Overview  Goal: Plan of Care/Patient Progress Review  Outcome: Adequate for Discharge Date Met:  11/04/17  Patient stable. Up ambulating. Voiding without problems. Patient discharging home. Discharge instructions gone over and patient verbalized understanding and discharged home.

## 2021-10-28 NOTE — ED AVS SNAPSHOT
[facility]    [address]    [facility] [address]    Phone:  [phone]    Fax:  [phone]                                       Maddi Muller   MRN: [id number]    Department:  [facility]   Date of Visit:  3/5/2017           After Visit Summary Signature Page     I have received my discharge instructions, and my questions have been answered. I have discussed any challenges I see with this plan with the nurse or doctor.    ..........................................................................................................................................  Patient/Patient Representative Signature      ..........................................................................................................................................  Patient Representative Print Name and Relationship to Patient    ..................................................               ................................................  Date                                            Time    ..........................................................................................................................................  Reviewed by Signature/Title    ...................................................              ..............................................  Date                                                            Time           [Negative] : Heme/Lymph [Joint Pain] : joint pain [Joint Stiffness] : joint stiffness [Joint Swelling] : joint swelling

## 2022-04-28 NOTE — PLAN OF CARE
Patient is discharging in a stable condition to home with baby and mother.  Discharge instructions and medications given and reviewed.  Plan is to follow up in clinic in 6 weeks.  She has no further questions at this time.  ID bands were verified.   Jessica Kennedy (Speech Pathology)  Gustavo Tan (Infectious Diseases)  Venkat Nguyễn (Gastroenterology)  Bj Macdonald ([facility])  Rianna Hill (Palliative Care)  Gigi Gauthier ([facility])  Antony Mckeon (Cardiac Electrophysiology)

## (undated) DEVICE — SUCTION CANISTER MEDIVAC LINER 3000ML W/LID 65651-530

## (undated) DEVICE — BLADE CLIPPER SGL USE 9680

## (undated) DEVICE — LINEN FULL SHEET 5511

## (undated) DEVICE — STOCKING SLEEVE VASOPRESS COMPRESSION CALF MED 18" VP501M

## (undated) DEVICE — TRANSFER DEVICE BLOOD NDL HOLDER 364880

## (undated) DEVICE — DRSG TELFA ISLAND 4X10"

## (undated) DEVICE — PACK C-SECTION LF PL15OTA83B

## (undated) DEVICE — SOL NACL 0.9% IRRIG 1000ML BOTTLE 2F7124

## (undated) DEVICE — SOL WATER IRRIG 1000ML BOTTLE 2F7114

## (undated) DEVICE — CAP BABY PINK/BLUE IC-2

## (undated) DEVICE — STPL SKIN 35W APPOSE 8886803712

## (undated) DEVICE — CATH TRAY FOLEY SURESTEP 16FR DRAIN BAG STATOCK A899916

## (undated) DEVICE — GLOVE PROTEXIS BLUE W/NEU-THERA 6.5  2D73EB65

## (undated) DEVICE — LINEN DRAPE 54X72" 5467

## (undated) DEVICE — PAD CHUX UNDERPAD 30X36" P3036C

## (undated) DEVICE — LINEN HALF SHEET 5512

## (undated) DEVICE — LINEN BABY BLANKET 5434

## (undated) DEVICE — SU MONOCRYL 4-0 PS-2 18" UND Y496G

## (undated) DEVICE — GLOVE PROTEXIS MICRO 6.5  2D73PM65

## (undated) DEVICE — GLOVE PROTEXIS W/NEU-THERA 6.5  2D73TE65

## (undated) DEVICE — SU VICRYL 2-0 CT-1 36" J345H

## (undated) DEVICE — LINEN TOWEL PACK X10 5473

## (undated) DEVICE — PREP CHLORAPREP 26ML TINTED ORANGE  260815

## (undated) DEVICE — BAG CLEAR TRASH 1.3M 39X33" P4040C

## (undated) DEVICE — SU VICRYL 0 CT-1 36" J346H

## (undated) DEVICE — ESU GROUND PAD ADULT W/CORD E7507

## (undated) RX ORDER — MORPHINE SULFATE 1 MG/ML
INJECTION, SOLUTION EPIDURAL; INTRATHECAL; INTRAVENOUS
Status: DISPENSED
Start: 2017-11-01

## (undated) RX ORDER — EPHEDRINE SULFATE 50 MG/ML
INJECTION, SOLUTION INTRAMUSCULAR; INTRAVENOUS; SUBCUTANEOUS
Status: DISPENSED
Start: 2017-11-01

## (undated) RX ORDER — OXYTOCIN/0.9 % SODIUM CHLORIDE 30/500 ML
PLASTIC BAG, INJECTION (ML) INTRAVENOUS
Status: DISPENSED
Start: 2017-11-01

## (undated) RX ORDER — OXYMETAZOLINE HYDROCHLORIDE 0.05 G/100ML
SPRAY NASAL
Status: DISPENSED
Start: 2017-11-01

## (undated) RX ORDER — ONDANSETRON 2 MG/ML
INJECTION INTRAMUSCULAR; INTRAVENOUS
Status: DISPENSED
Start: 2017-11-01